# Patient Record
Sex: MALE | Race: WHITE | Employment: OTHER | URBAN - METROPOLITAN AREA
[De-identification: names, ages, dates, MRNs, and addresses within clinical notes are randomized per-mention and may not be internally consistent; named-entity substitution may affect disease eponyms.]

---

## 2018-12-11 ENCOUNTER — OFFICE VISIT (OUTPATIENT)
Dept: OPTOMETRY | Facility: CLINIC | Age: 63
End: 2018-12-11
Payer: COMMERCIAL

## 2018-12-11 DIAGNOSIS — T15.01XA CORNEAL FOREIGN BODY, RIGHT, INITIAL ENCOUNTER: Primary | ICD-10-CM

## 2018-12-11 PROCEDURE — 65220 REMOVE FOREIGN BODY FROM EYE: CPT | Performed by: OPTOMETRIST

## 2018-12-11 PROCEDURE — 99203 OFFICE O/P NEW LOW 30 MIN: CPT | Mod: 25 | Performed by: OPTOMETRIST

## 2018-12-11 RX ORDER — OFLOXACIN 3 MG/ML
1 SOLUTION/ DROPS OPHTHALMIC 4 TIMES DAILY
Status: DISPENSED | OUTPATIENT
Start: 2018-12-11

## 2018-12-11 ASSESSMENT — CONF VISUAL FIELD
OD_NORMAL: 1
OS_NORMAL: 1

## 2018-12-11 ASSESSMENT — TONOMETRY
IOP_METHOD: ICARE
OS_IOP_MMHG: 16
OD_IOP_MMHG: 13

## 2018-12-11 ASSESSMENT — EXTERNAL EXAM - LEFT EYE: OS_EXAM: NORMAL

## 2018-12-11 ASSESSMENT — SLIT LAMP EXAM - LIDS: COMMENTS: NORMAL

## 2018-12-11 ASSESSMENT — EXTERNAL EXAM - RIGHT EYE: OD_EXAM: NORMAL

## 2018-12-11 ASSESSMENT — VISUAL ACUITY
OS_CC: 20/25
METHOD: SNELLEN - LINEAR
OD_PH_CC: 20/30
OD_CC: 20/70
CORRECTION_TYPE: GLASSES

## 2018-12-11 NOTE — PATIENT INSTRUCTIONS
Metallic foreign body removed from central right cornea with daiana brush today.  Patient tolerated procedure well.   Begin ofloxacin eye drops 4x daily in the right eye only for 1 week.     Return by the end of the week if no improvement in symptoms.        Larry Latham O.D.  [address]  [place]  [address]    [phone]

## 2018-12-11 NOTE — LETTER
12/11/2018         RE: Magdaleno Springer  [address] [facility]  [place] [address]        Dear Colleague,    Thank you for referring your patient, Magdaleno Springer, to the [facility]. Please see a copy of my visit note below.    Chief Complaint   Patient presents with     Eye Pain       Do you wear contact lenses? No        Larry Latham, OD     See Review Of Systems   Eyes: positive for eye pain, photophobia  Constitutional: No fevers, chills, or weight changes.      HPI performed by Optometrist  scribed by Meka Seay, Optometric Tech    Medical, surgical and family histories reviewed and updated 12/11/2018.         OBJECTIVE: See Ophthalmology exam    ASSESSMENT:    ICD-10-CM    1. Corneal foreign body, right, initial encounter T15.01XA ofloxacin (OCUFLOX) 0.3 % ophthalmic solution 1 drop      PLAN:    Patient Instructions   Metallic foreign body removed from central right cornea with daiana brush today.  Patient tolerated procedure well.   Begin ofloxacin eye drops 4x daily in the right eye only for 1 week.     Return by the end of the week if no improvement in symptoms.        Larry Latham O.D.  [facility]  [address] [place] [address]. [place]  [address]    [phone]           Again, thank you for allowing me to participate in the care of your patient.        Sincerely,        Larry Latham, OD

## 2018-12-11 NOTE — PROGRESS NOTES
Chief Complaint   Patient presents with     Eye Pain       Do you wear contact lenses? No        Larry Latham, OD     See Review Of Systems   Eyes: positive for eye pain, photophobia  Constitutional: No fevers, chills, or weight changes.      HPI performed by Optometrist  scribed by Meka Seay, Optometric Tech    Medical, surgical and family histories reviewed and updated 12/11/2018.         OBJECTIVE: See Ophthalmology exam    ASSESSMENT:    ICD-10-CM    1. Corneal foreign body, right, initial encounter T15.01XA ofloxacin (OCUFLOX) 0.3 % ophthalmic solution 1 drop      PLAN:    Patient Instructions   Metallic foreign body removed from central right cornea with daiana brush today.  Patient tolerated procedure well.   Begin ofloxacin eye drops 4x daily in the right eye only for 1 week.     Return by the end of the week if no improvement in symptoms.        Larry Latham O.D.  [address] [place]  [address]    [phone]

## 2019-01-01 ENCOUNTER — NURSE TRIAGE (OUTPATIENT)
Dept: NURSING | Facility: CLINIC | Age: 64
End: 2019-01-01

## 2019-01-02 ENCOUNTER — OFFICE VISIT (OUTPATIENT)
Dept: FAMILY MEDICINE | Facility: CLINIC | Age: 64
End: 2019-01-02
Payer: COMMERCIAL

## 2019-01-02 VITALS
TEMPERATURE: 97.4 F | OXYGEN SATURATION: 99 % | WEIGHT: 204 LBS | DIASTOLIC BLOOD PRESSURE: 73 MMHG | RESPIRATION RATE: 16 BRPM | HEART RATE: 57 BPM | SYSTOLIC BLOOD PRESSURE: 121 MMHG

## 2019-01-02 DIAGNOSIS — B02.9 HERPES ZOSTER WITHOUT COMPLICATION: ICD-10-CM

## 2019-01-02 DIAGNOSIS — L98.9 SKIN LESION: Primary | ICD-10-CM

## 2019-01-02 DIAGNOSIS — J40 BRONCHITIS: ICD-10-CM

## 2019-01-02 PROCEDURE — 99214 OFFICE O/P EST MOD 30 MIN: CPT | Performed by: FAMILY MEDICINE

## 2019-01-02 RX ORDER — PRAMIPEXOLE DIHYDROCHLORIDE 1.5 MG/1
TABLET ORAL
Refills: 3 | COMMUNITY
Start: 2018-11-27

## 2019-01-02 RX ORDER — VALACYCLOVIR HYDROCHLORIDE 1 G/1
1000 TABLET, FILM COATED ORAL 3 TIMES DAILY
Qty: 21 TABLET | Refills: 0 | Status: SHIPPED | OUTPATIENT
Start: 2019-01-02 | End: 2019-08-21

## 2019-01-02 RX ORDER — BENZONATATE 200 MG/1
200 CAPSULE ORAL 3 TIMES DAILY PRN
Qty: 21 CAPSULE | Refills: 0 | Status: SHIPPED | OUTPATIENT
Start: 2019-01-02 | End: 2019-01-09

## 2019-01-02 RX ORDER — GABAPENTIN 100 MG/1
100 CAPSULE ORAL
COMMUNITY
Start: 2018-07-18 | End: 2019-08-21

## 2019-01-02 RX ORDER — VALACYCLOVIR HYDROCHLORIDE 1 G/1
1 TABLET, FILM COATED ORAL
COMMUNITY
Start: 2018-08-27 | End: 2019-08-21

## 2019-01-02 RX ORDER — ATORVASTATIN CALCIUM 10 MG/1
TABLET, FILM COATED ORAL
Refills: 3 | COMMUNITY
Start: 2018-03-29 | End: 2019-08-21

## 2019-01-02 RX ORDER — ATORVASTATIN CALCIUM 80 MG/1
10 TABLET, FILM COATED ORAL
COMMUNITY
Start: 2014-01-13 | End: 2019-08-21

## 2019-01-02 ASSESSMENT — PAIN SCALES - GENERAL: PAINLEVEL: NO PAIN (0)

## 2019-01-02 NOTE — PROGRESS NOTES
SUBJECTIVE:   Magdaleno Springer is a 63 year old male who presents to clinic today for the following health issues:      Patient presents with:  Derm Problem: burning rash under left arm x 2 days    Patient is also concerned about a mole on his back and behind his left ear     Felt the rash 3 days ago behind the left arm/left upper back  Patient also started having a cold    Last night started having pain    Patient has had cold for about a week now - that's getting better but still have a lingering cough  Fever No  Sinus congestion/sinus pain No  Wheezing: No  Chest pain or exertional shortness of breath: NO   Exposure to pertussis or pertussis like symptoms: No  Orthopnea, worsening edema, pnd: NO  Rash: NO  Tried OTC medications without relief  No hemoptysis.  Worsening symptoms hence patient came in to be seen     Problem list and histories reviewed & adjusted, as indicated.  Additional history: as documented    Problem list, Medication list, Allergies, and Medical/Social/Surgical histories reviewed in EPIC and updated as appropriate.    ROS:  Constitutional, HEENT, cardiovascular, pulmonary, gi and gu systems are negative, except as otherwise noted.    OBJECTIVE:                                                    /73   Pulse 57   Temp 97.4  F (36.3  C) (Oral)   Resp 16   Wt 92.5 kg (204 lb)   SpO2 99%   There is no height or weight on file to calculate BMI.  GENERAL: healthy, alert and no distress  EYES: pink palpebral conjunctiva, anicteric sclera  ENT: midline nasal septum normal ear exam. Slight erythema but not much congested sinuses.   Mouth: moist buccal mucosa nonhyperemic posterior pharyngeal wall. No tonsillar enlargement or cellulitis  NECK: no adenopathy, no asymmetry, masses, or scars and thyroid normal to palpation  RESP: lungs clear to auscultation - No  rales, rhonchi or wheezes    CV: regular rate and rhythm, normal S1 S2, no S3 or S4,  No murmurs, click or rub  SKIN:   1. Erythematous  vesicles of erythematous base clustered over left flank upper back area following dermatomal distribution  2. seb keratoses noted on left back  3. Hyperpigmented skin lesion with slight scale left lower back   Pscyh: Appropriate mood and affect  MS: no gross musculoskeletal defects noted    Diagnostic Test Results:  none      ASSESSMENT/PLAN:                                                        ICD-10-CM    1. Skin lesion L98.9 DERMATOLOGY REFERRAL   2. Herpes zoster without complication B02.9 valACYclovir (VALTREX) 1000 mg tablet   3. Bronchitis J40 benzonatate (TESSALON) 200 MG capsule     Given AVS and discussed    Prescribed with valtrex for shingles  Alarm signs or symptoms discussed, if present recommend go to ER   Possible complications discussed     Bronchitis presumed viral. Already improving   Advised that prolonged cough > 3 weeks recommend chest xray, offered today, declined.   Adverse reactions of medications discussed.  Over the counter medications discussed.   Aware to come back in if with worsening symptoms or if no relief despite treatment plan  Patient voiced understanding and had no further questions.     Back skin lesions - per wife increasing in size- recommend dermatology evaluation - referral placed  Alarm signs or symptoms discussed, if present recommend go to ER       MD Milly Bajwa MD  [facility]

## 2019-01-02 NOTE — TELEPHONE ENCOUNTER
"Caller: Kenyetta, wife & self  Reason for call: \"he has a rash below his armpit, towards the back on his right side, could this be shingles?\" Patient reports having cold symptoms with cough x 3 days.  Symptoms: rash on right trunk (red, \"goose bump like\", bumpy, raised, 3 inches jaimie, with a couple spots further away), mild itch, burning pain (started yesterday)  Symptoms started: noticed on Sunday   Denies fever, severe itching, blisters, difficulty breathing or swallowing, or drainage. Denies new medication, foods, or topical irritants.  Pain? yes   Location: right trunk, \"a burning zing\"   Rated: \"3/10\"  Constant or intermittent? \"comes and goes\"    Home cares tried: cortisone cream  Educated to shingles symptoms and duration. Emergent symptoms reviewed.  Care advice given per triage protocol; per triage guideline, advised caller to be seen by a provider within the next 24 hours, sooner if possible to start antiviral if actual shingles. Caller verbalized understanding of care advice given and plans to try and be seen at a local urgent care clinic now. Caller had no further questions.     Tanya Em RN  [facility]  (see bottom of encounter for care advice details)    Reason for Disposition    [1] Shingles rash (matches SYMPTOMS) AND [2] onset within past 72 hours    Additional Information    Negative: [1] Sudden onset of rash (within last 2 hours) AND [2] difficulty with breathing or swallowing    Negative: Sounds like a life-threatening emergency to the triager    Shingles suspected (i.e., painful rash, multiple small blisters grouped together in one area of body; dermatomal distribution)    Negative: Difficult to awaken or acting confused  (e.g., disoriented, slurred speech)    Negative: Sounds like a life-threatening emergency to the triager    Negative: [1] Localized rash AND [2] doesn't match the SYMPTOMS of shingles    Negative: [1] Back pain AND [2] doesn't match the SYMPTOMS of shingles    " "Negative: Patient sounds very sick or weak to the triager    Negative: [1] Shingles rash (matches SYMPTOMS) AND [2] weak immune system (e.g., HIV positive,  cancer chemotherapy, chronic steroid treatment, splenectomy) AND [3] NOT taking antiviral medication    Negative: Shingles rash on the eyelid or tip of the nose    Negative: [1] Shingles rash of face AND [2] eye pain or blurred vision    Negative: [1] Shingles rash of face AND [2] facial weakness    Negative: [1] Shingles rash of face or ear AND [2] earache or ringing in the ear    Negative: [1] Shingles rash AND [2] spots start appearing other places on body    Negative: Fever > 100.5 F (38.1 C)    Negative: SEVERE pain (e.g., excruciating)    Answer Assessment - Initial Assessment Questions  1. APPEARANCE of RASH: \"Describe the rash.\"       Red, small bumps  2. LOCATION: \"Where is the rash located?\"       Right trunk  3. ONSET: \"When did the rash start?\"       Sunday?  4. ITCHING: \"Does the rash itch?\" If so, ask: \"How bad is the itch?\"  (Scale 1-10; or mild, moderate, severe)      mild  5. PAIN: \"Does the rash hurt?\" If so, ask: \"How bad is the pain?\"  (Scale 1-10; or mild, moderate, severe)      mild  6. OTHER SYMPTOMS: \"Do you have any other symptoms?\" (e.g., fever)      Cold symptoms  7. PREGNANCY: \"Is there any chance you are pregnant?\" \"When was your last menstrual period?\"      male    Protocols used: SHINGLES-ADULT-AH, RASH OR REDNESS - LOCALIZED-ADULT-AH      "

## 2019-01-02 NOTE — PATIENT INSTRUCTIONS
Patient Education     Shingles  Shingles is a viral infection caused by the same virus that causes chicken pox. Anyone who has had chicken pox may get shingles later in life. The virus stays in the body, but remains asleep (dormant). Shingles often occurs in older persons or persons with lowered immunity. But it can affect anyone at any age.  Shingles starts as a tingling patch of skin on one side of the body. Small, painful blisters may then appear. The rash rarely spreads to other parts of the body.  Exposure to shingles can't cause shingles. However, it can cause chicken pox in anyone who has not had chicken pox or has not been vaccinated. The contagious period ends when all blisters have crusted over, generally 1 to 2 weeks after the illness starts.  After the blisters heal, the affected skin may be sensitive or painful for weeks or months, gradually resolving over time. But, sometimes this can last longer and be permanent (called postherpetic neuralgia.)  Shingles vaccines are available. Vaccination can help prevent shingles or make it less painful. It is generally recommended for adults older than 50, even if you've had singles in the past. Talk with your healthcare provider about when to get vaccinated and which vaccine is best for you.  Home care    Medicines may be prescribed to help relieve pain. Take these medicines as directed. Ask your healthcare provider or pharmacist before using over-the-counter medicines for helping treat pain and itching.    In certain cases, antiviral medicines may be prescribed to reduce pain, shorten the illness, and prevent neuralgia. Take these medicines as directed.    Compresses made from a solution of cool water mixed with cornstarch or baking soda may help relieve pain and itching.     Gently wash skin daily with soap and water to help prevent infection. Be certain to rinse off all of the soap, which can be irritating.    Trim fingernails and try not to scratch.  Scratching the sores may leave scars.    Stay home from work or school until all blisters have formed a crust and you are no longer contagious.  Follow-up care  Follow up with your healthcare provider, or as directed.  When to seek medical advice    Fever of 100.4 F (38 C) or higher, or as directed by your healthcare provider    Affected skin is on the face or neck and any of the following occur:  ? Headache  ? Eye pain  ? Changes in vision  ? Sores near the eye  ? Weakness of facial muscles    Blisters occurring on new areas of the body    Pain, redness, or swelling of a joint    Signs of skin infection: colored drainage from the sores, warmth, increasing redness, fever, or increasing pain   Date Last Reviewed: 4/1/2018 2000-2018 [employer]. [address] [place] [address], [place] [address]. All rights reserved. This information is not intended as a substitute for professional medical care. Always follow your healthcare professional's instructions.           Patient Education     Viral Bronchitis (Adult)    You have a viral bronchitis. Bronchitis is inflammation and swelling of the lining of the lungs. This is often caused by an infection. Symptoms include a dry, hacking cough that is worse at night. The cough may bring up yellow-green mucus. You may also feel short of breath or wheeze. Other symptoms may include tiredness, chest discomfort, and chills.  Bronchitis that is caused by a virus is not treated with antibiotics. Instead, medicines may be given to help relieve symptoms. Symptoms can last up to 2 weeks, although the cough may last much longer.  This illness is contagious during the first few days and is spread through the air by coughing and sneezing, or by direct contact (touching the sick person and then touching your own eyes, nose, or mouth).  Most viral illnesses resolve within 10 to 14 days with rest and simple home remedies, although they may sometimes last for several weeks.  Home  care    If symptoms are severe, rest at home for the first 2 to 3 days. When you go back to your usual activities, don't let yourself get too tired.    Do not smoke. Also avoid being exposed to secondhand smoke.    You may use over-the-counter medicine to control fever or pain, unless another pain medicine was prescribed. If you have chronic liver or kidney disease or have ever had a stomach ulcer or gastrointestinal bleeding, talk with your healthcare provider before using these medicines. Also talk to your provider if you are taking medicine to prevent blood clots. Aspirin should never be given to anyone younger than 18 years of age who is ill with a viral infection or fever. It may cause severe liver or brain damage.    Your appetite may be poor, so a light diet is fine. Avoid dehydration by drinking 6 to 8 glasses of fluids per day (such as water, soft drinks, sports drinks, juices, tea, or soup). Extra fluids will help loosen secretions in the nose and lungs.    Over-the-counter cough, cold, and sore-throat medicines will not shorten the length of the illness, but they may help to reduce symptoms. Don't use decongestants if you have high blood pressure.  Follow-up care  Follow up with your healthcare provider, or as advised. If you had an X-ray or ECG (electrocardiogram), a specialist will review it. You will be notified of any new findings that may affect your care.  If you are age 65 or older, or if you have a chronic lung disease or condition that affects your immune system, or you smoke, ask your healthcare provider about getting a pneumococcal vaccine and a yearly flu shot (influenza vaccine).  When to seek medical advice  Call your healthcare provider right away if any of these occur:    Fever of 100.4 F (38 C) or higher, or as directed by your healthcare provider    Coughing up increased amounts of colored sputum    Weakness, drowsiness, headache, facial pain, ear pain, or a stiff neck  Call 911  Call  911 if any of these occur:    Coughing up blood    Worsening weakness, drowsiness, headache, or stiff neck    Trouble breathing, wheezing, or pain with breathing  Date Last Reviewed: 6/1/2018 2000-2018 [employer]. [address] [place] [address]. All rights reserved. This information is not intended as a substitute for professional medical care. Always follow your healthcare professional's instructions.           Patient Education     Valacyclovir Hydrochloride Oral tablet  What is this medicine?  VALACYCLOVIR (josh ay SYE kloe veer) is an antiviral medicine. It is used to treat or prevent infections caused by certain kinds of viruses. Examples of these infections include herpes and shingles. This medicine will not cure herpes.  This medicine may be used for other purposes; ask your health care provider or pharmacist if you have questions.  What should I tell my health care provider before I take this medicine?  They need to know if you have any of these conditions:    acquired immunodeficiency syndrome (AIDS)    any other condition that may weaken the immune system    bone marrow or kidney transplant    kidney disease    an unusual or allergic reaction to valacyclovir, acyclovir, ganciclovir, valganciclovir, other medicines, foods, dyes, or preservatives    pregnant or trying to get pregnant    breast-feeding  How should I use this medicine?  Take this medicine by mouth with a glass of water. Follow the directions on the prescription label. You can take this medicine with or without food. Take your doses at regular intervals. Do not take your medicine more often than directed. Finish the full course prescribed by your doctor or health care professional even if you think your condition is better. Do not stop taking except on the advice of your doctor or health care professional.  Talk to your pediatrician regarding the use of this medicine in children. While this drug may be prescribed for  children as young as 2 years for selected conditions, precautions do apply.  Overdosage: If you think you have taken too much of this medicine contact a poison control center or emergency room at once.  NOTE: This medicine is only for you. Do not share this medicine with others.  What if I miss a dose?  If you miss a dose, take it as soon as you can. If it is almost time for your next dose, take only that dose. Do not take double or extra doses.  What may interact with this medicine?    cimetidine    probenecid  This list may not describe all possible interactions. Give your health care provider a list of all the medicines, herbs, non-prescription drugs, or dietary supplements you use. Also tell them if you smoke, drink alcohol, or use illegal drugs. Some items may interact with your medicine.  What should I watch for while using this medicine?  Tell your doctor or health care professional if your symptoms do not start to get better after 1 week.  This medicine works best when taken early in the course of an infection, within the first 72 hours. Begin treatment as soon as possible after the first signs of infection like tingling, itching, or pain in the affected area.  It is possible that genital herpes may still be spread even when you are not having symptoms. Always use safer sex practices like condoms made of latex or polyurethane whenever you have sexual contact.  You should stay well hydrated while taking this medicine. Drink plenty of fluids.  What side effects may I notice from receiving this medicine?  Side effects that you should report to your doctor or health care professional as soon as possible:    allergic reactions like skin rash, itching or hives, swelling of the face, lips, or tongue    aggressive behavior    confusion    hallucinations    problems with balance, talking, walking    stomach pain    tremor    trouble passing urine or change in the amount of urine  Side effects that usually do not  require medical attention (report to your doctor or health care professional if they continue or are bothersome):    dizziness    headache    nausea, vomiting  This list may not describe all possible side effects. Call your doctor for medical advice about side effects. You may report side effects to FDA at 0-939-OPR-9944.  Where should I keep my medicine?  Keep out of the reach of children.  Store at room temperature between 15 and 25 degrees C (59 and 77 degrees F). Keep container tightly closed. Throw away any unused medicine after the expiration date.  NOTE:This sheet is a summary. It may not cover all possible information. If you have questions about this medicine, talk to your doctor, pharmacist, or health care provider. Copyright  2016 [employer]

## 2019-01-03 ENCOUNTER — PATIENT OUTREACH (OUTPATIENT)
Dept: CARE COORDINATION | Facility: CLINIC | Age: 64
End: 2019-01-03

## 2019-01-04 ENCOUNTER — OFFICE VISIT (OUTPATIENT)
Dept: DERMATOLOGY | Facility: CLINIC | Age: 64
End: 2019-01-04

## 2019-01-04 DIAGNOSIS — L21.9 DERMATITIS, SEBORRHEIC: ICD-10-CM

## 2019-01-04 DIAGNOSIS — B02.9 HERPES ZOSTER WITHOUT COMPLICATION: Primary | ICD-10-CM

## 2019-01-04 RX ORDER — TRIAMCINOLONE ACETONIDE 1 MG/G
OINTMENT TOPICAL 2 TIMES DAILY PRN
Qty: 30 G | Refills: 0 | Status: SHIPPED | OUTPATIENT
Start: 2019-01-04 | End: 2019-08-21

## 2019-01-04 RX ORDER — FLUOCINOLONE ACETONIDE 0.11 MG/ML
OIL TOPICAL DAILY PRN
Qty: 118.28 ML | Refills: 1 | Status: SHIPPED | OUTPATIENT
Start: 2019-01-04 | End: 2019-08-21

## 2019-01-04 ASSESSMENT — PAIN SCALES - GENERAL: PAINLEVEL: NO PAIN (0)

## 2019-01-04 NOTE — PROGRESS NOTES
[facility] Dermatology Note      Dermatology Problem List:  FBSE 1/4/19  1. Shingles of left lateral chest  - valtrex, triamcinolone, gabapentin  2. Seb derm  - fluocinolone oil for ears  3. Benign nevi  4. SKs    Encounter Date: Jan 4, 2019    CC:   Chief Complaint   Patient presents with     Skin Check     Magdaleno is here for skin check.         History of Present Illness:  Mr. Magdaleno Springer is a 63 year old male who presents as a referral from [facility] for skin check and possible shingles. Has had a mole on the back that has been present for years that was removed but has regrown. Has otherwise been asymptomatic.      First noticed rash on Sunday and then developed pain about 2 days later. Was thought to have shingles by PCP. Did have shingles vaccine about 5-6 years ago.       Notes that he often gets itchiness inside of his ears. Has been using his wife's clobetasol solution for treatment.      Patient denies any painful, bleeding, changing, or darkening lesions.    Patient reports no use of SPF. Reports no history of tanning bed use. Reports history of blistering sunburns. No personal or family history of skin cancer.        Past Medical History:   There is no problem list on file for this patient.    No past medical history on file.  Past Surgical History:   Procedure Laterality Date     RETINAL REATTACHMENT         Social History:  Patient reports that  has never smoked. he has never used smokeless tobacco.    Family History:  Family History   Problem Relation Age of Onset     Glaucoma No family hx of      Macular Degeneration No family hx of      Melanoma No family hx of      Skin Cancer No family hx of        Medications:  Current Outpatient Medications   Medication Sig Dispense Refill     atorvastatin (LIPITOR) 10 MG tablet   3     atorvastatin (LIPITOR) 80 MG tablet Take 10 mg by mouth       benzonatate (TESSALON) 200 MG capsule Take 1 capsule (200 mg) by mouth 3 times daily as  needed for cough 21 capsule 0     gabapentin (NEURONTIN) 100 MG capsule Take 100 mg by mouth       pramipexole (MIRAPEX) 1.5 MG tablet   3     valACYclovir (VALTREX) 1000 mg tablet Take 1 tablet by mouth       valACYclovir (VALTREX) 1000 mg tablet Take 1 tablet (1,000 mg) by mouth 3 times daily for 7 days 21 tablet 0        No Known Allergies      Review of Systems:  -As per HPI  -Constitutional: Otherwise feeling well today, in usual state of health.  -HEENT: Patient denies nonhealing oral sores.  -Skin: As above in HPI. No additional skin concerns.    Physical exam:  Vitals: There were no vitals taken for this visit.  GEN: This is a well developed, well-nourished male in no acute distress, in a pleasant mood.    SKIN: Full skin, which includes the head/face, both arms, chest, back, abdomen,both legs, genitalia and/or groin buttocks, digits and/or nails, was examined.  -Outer ear canals without obvious scaling or erythema  -Few scattered regular brown pigmented macules and papules are identified on the trunk and extremities.   -There are waxy stuck on tan to brown papules on the trunk.  -There are dome shaped bright red papules on the trunk.  -Left lateral chest with ovoid cluster of dark red papules in dermatomal distribution.    -No other lesions of concern on areas examined.     Impression/Plan:  1. Multiple clinically benign nevi on the trunk    ABCDs of melanoma were discussed and self skin checks were advised.    Sun precaution was advised including the use of sun screens of SPF 30 or higher, sun protective clothing, and avoidance of tanning beds.    2. Seborrheic keratosis, non irritated    Discussed benign nature of lesions    3. Shingles of left lateral chest    Complete valtrex as prescribed by PCP    Recommended use of triamcinolone 0.1% ointment BID PRN    May take gabapentin 300mg TID (instructed to slowly uptitrate to max dose) if he is having pain    Instructed to hold off on having shingrix and flu  vaccines for at least 1-2 weeks until rash clears    Discussed that he should follow-up with PCP    4. Seborrheic dermatitis of ears    Start fluocinolone 0.01% oil to ears daily PRN itching    Recommended that he stop using clobetasol solution      CC Referred Self, MD  No address on file on close of this encounter.  Follow-up in 2 years, earlier for new or changing lesions.       Staff Physician Comments:  I saw and evaluated the patient with the resident and I agree with the assessment and plan as documented in the resident's note.    Magdaleno Medina MD  Professor  Head of Dermato-Allergy Division  Department of Dermatology  [facility]      Staff Involved:  Resident(Pati Ruth)/Staff(as above)    Pati Ruth M.D.  PGY-3 Resident  Dermatology  [facility]

## 2019-01-04 NOTE — PATIENT INSTRUCTIONS
"The ABCDEs of Melanoma    Skin cancer can develop anywhere on the skin. Ask someone for help when checking your skin, especially in hard to see places. If you notice a mole different from others, or that changes, enlarges, itches, or bleeds (even if it is small), you should see a dermatologist.                  Overview    Seborrheic keratoses are common benign growths of unknown cause seen in adults   due to a thickening of an area of the top skin layer.    Who's At Risk  Although they can occur anytime after puberty, almost everyone over 50 has one or more of these and they increase in number with age. Some families have an inherited tendency to grow multiple lesions. Men and women are equally as likely to develop seborrheic keratoses. Dark-skinned people are less affected than those with light skin; a variant seen in blacks is called dermatosis papulosa nigra.    Signs & Symptoms  One or more spots can occur anywhere on the body, except for palms, soles, and mucous membranes (eg, in the mouth or rectum). They do not go away. They do not turn into cancers, but some cancers resemble seborrheic keratosis.    They start as light brown to skin-colored, flat areas, which are round to oval and of varying size (usually less than a half inch, but sometimes much larger). As they grow thicker and rise above the skin surface, seborrheic keratoses may become dark brown to almost black with a \"stuck on\" appearance. The surface may feel smooth or rough.    Self-Care Guidelines  No treatment is needed unless there is irritation from clothing with itching or bleeding.    There is no way to prevent new spots from forming.    Some lotions with alpha hydroxyl acids may make the areas feel smoother with regular use but will not eliminate them.    OTC freezing techniques are available but usually not effective.    When to Seek Medical Care  If a spot on the skin is growing, bleeding, painful, or itchy, see your doctor.    Spots can be " removed if you don't want them, but removal is considered a cosmetic issue and is usually not covered by insurance.    Treatments Your Physician May Prescribe  Removal can be accomplished with freezing (cryosurgery), scraping (curettage), burning (electrocautery), lasers, or with acids. The doctor might conduct a biopsy if the growth looks unusual.    References:  Diego Lua, ed. Dermatology, pp.9633-8846. [place]: [employer], 2003.    Israel Garcia, ed. Daya's Dermatology in General Medicine. 6th ed, pp.767-770. [place]: [employer], 2003.

## 2019-01-04 NOTE — NURSING NOTE
Dermatology Rooming Note    Magdaleno Springer's goals for this visit include:   Chief Complaint   Patient presents with     Skin Check     Magdaleno is here for skin check.     Esha Tiwari, CMA

## 2019-01-04 NOTE — LETTER
Date:January 7, 2019      Patient was self referred, no letter generated. Do not send.        [facility] Health Information

## 2019-01-04 NOTE — LETTER
1/4/2019       RE: Magdaleno Springer  [address]  [place] [address]     Dear Colleague,    Thank you for referring your patient, Magdaleno Springer, to the [facility] DERMATOLOGY at [facility]. Please see a copy of my visit note below.    [facility] Dermatology Note      Dermatology Problem List:  FBSE 1/4/19  1. Shingles of left lateral chest  - valtrex, triamcinolone, gabapentin  2. Seb derm  - fluocinolone oil for ears  3. Benign nevi  4. SKs    Encounter Date: Jan 4, 2019    CC:   Chief Complaint   Patient presents with     Skin Check     Magdaleno is here for skin check.         History of Present Illness:  Mr. Magdaleno Springer is a 63 year old male who [facility] and possible shingles. Has had a mole on the back that has been present for years that was removed but has regrown. Has otherwise been asymptomatic.      First noticed rash on Sunday and then developed pain about 2 days later. Was thought to have shingles by PCP. Did have shingles vaccine about 5-6 years ago.       Notes that he often gets itchiness inside of his ears. Has been using his wife's clobetasol solution for treatment.      Patient denies any painful, bleeding, changing, or darkening lesions.    Patient reports no use of SPF. Reports no history of tanning bed use. Reports history of blistering sunburns. No personal or family history of skin cancer.        Past Medical History:   There is no problem list on file for this patient.    No past medical history on file.  Past Surgical History:   Procedure Laterality Date     RETINAL REATTACHMENT         Social History:  Patient reports that  has never smoked. he has never used smokeless tobacco.    Family History:  Family History   Problem Relation Age of Onset     Glaucoma No family hx of      Macular Degeneration No family hx of      Melanoma No family hx of      Skin Cancer No family hx of         Medications:  Current Outpatient Medications   Medication Sig Dispense Refill     atorvastatin (LIPITOR) 10 MG tablet   3     atorvastatin (LIPITOR) 80 MG tablet Take 10 mg by mouth       benzonatate (TESSALON) 200 MG capsule Take 1 capsule (200 mg) by mouth 3 times daily as needed for cough 21 capsule 0     gabapentin (NEURONTIN) 100 MG capsule Take 100 mg by mouth       pramipexole (MIRAPEX) 1.5 MG tablet   3     valACYclovir (VALTREX) 1000 mg tablet Take 1 tablet by mouth       valACYclovir (VALTREX) 1000 mg tablet Take 1 tablet (1,000 mg) by mouth 3 times daily for 7 days 21 tablet 0        No Known Allergies      Review of Systems:  -As per HPI  -Constitutional: Otherwise feeling well today, in usual state of health.  -HEENT: Patient denies nonhealing oral sores.  -Skin: As above in HPI. No additional skin concerns.    Physical exam:  Vitals: There were no vitals taken for this visit.  GEN: This is a well developed, well-nourished male in no acute distress, in a pleasant mood.    SKIN: Full skin, which includes the head/face, both arms, chest, back, abdomen,both legs, genitalia and/or groin buttocks, digits and/or nails, was examined.  -Outer ear canals without obvious scaling or erythema  -Few scattered regular brown pigmented macules and papules are identified on the trunk and extremities.   -There are waxy stuck on tan to brown papules on the trunk.  -There are dome shaped bright red papules on the trunk.  -Left lateral chest with ovoid cluster of dark red papules in dermatomal distribution.    -No other lesions of concern on areas examined.     Impression/Plan:  1. Multiple clinically benign nevi on the trunk    ABCDs of melanoma were discussed and self skin checks were advised.    Sun precaution was advised including the use of sun screens of SPF 30 or higher, sun protective clothing, and avoidance of tanning beds.    2. Seborrheic keratosis, non irritated    Discussed benign nature of  lesions    3. Shingles of left lateral chest    Complete valtrex as prescribed by PCP    Recommended use of triamcinolone 0.1% ointment BID PRN    May take gabapentin 300mg TID (instructed to slowly uptitrate to max dose) if he is having pain    Instructed to hold off on having shingrix and flu vaccines for at least 1-2 weeks until rash clears    Discussed that he should follow-up with PCP    4. Seborrheic dermatitis of ears    Start fluocinolone 0.01% oil to ears daily PRN itching    Recommended that he stop using clobetasol solution      CC Referred Self, MD  No address on file on close of this encounter.  Follow-up in 2 years, earlier for new or changing lesions.       Staff Physician Comments:  I saw and evaluated the patient with the resident and I agree with the assessment and plan as documented in the resident's note.    Magdaleno Medina MD  Professor  Head of Dermato-Allergy Division  Department of Dermatology  [facility], [place], [place]      Staff Involved:  Resident(Pati Ruth)/Staff(as above)    Pati Ruth M.D.  PGY-3 Resident  Dermatology  [facility]      Again, thank you for allowing me to participate in the care of your patient.      Sincerely,    Magdaleno Medina MD

## 2019-01-14 ENCOUNTER — ALLIED HEALTH/NURSE VISIT (OUTPATIENT)
Dept: NURSING | Facility: CLINIC | Age: 64
End: 2019-01-14
Payer: COMMERCIAL

## 2019-01-14 DIAGNOSIS — Z23 NEED FOR PROPHYLACTIC VACCINATION AND INOCULATION AGAINST INFLUENZA: Primary | ICD-10-CM

## 2019-01-14 DIAGNOSIS — Z23 ENCOUNTER FOR IMMUNIZATION: ICD-10-CM

## 2019-01-14 PROCEDURE — 90471 IMMUNIZATION ADMIN: CPT

## 2019-01-14 PROCEDURE — 90750 HZV VACC RECOMBINANT IM: CPT

## 2019-01-14 PROCEDURE — 90686 IIV4 VACC NO PRSV 0.5 ML IM: CPT

## 2019-01-14 PROCEDURE — 90472 IMMUNIZATION ADMIN EACH ADD: CPT

## 2019-01-14 PROCEDURE — 99207 ZZC NO CHARGE NURSE ONLY: CPT

## 2019-01-14 NOTE — NURSING NOTE
Screening Questionnaire for Adult Immunization    Are you sick today?   No   Do you have allergies to medications, food, a vaccine component or latex?   No   Have you ever had a serious reaction after receiving a vaccination?   No   Do you have a long-term health problem with heart disease, lung disease, asthma, kidney disease, metabolic disease (e.g. diabetes), anemia, or other blood disorder?   No   Do you have cancer, leukemia, HIV/AIDS, or any other immune system problem?   No   In the past 3 months, have you taken medications that affect  your immune system, such as prednisone, other steroids, or anticancer drugs; drugs for the treatment of rheumatoid arthritis, Crohn s disease, or psoriasis; or have you had radiation treatments?   No   Have you had a seizure, or a brain or other nervous system problem?   No   During the past year, have you received a transfusion of blood or blood     products, or been given immune (gamma) globulin or antiviral drug?   No   For women: Are you pregnant or is there a chance you could become        pregnant during the next month?   No   Have you received any vaccinations in the past 4 weeks?   No     Immunization questionnaire answers were all negative.        Per orders of Dr. Morataya, injection of shingrix given by Carrie Arauz. Patient instructed to remain in clinic for 15 minutes afterwards, and to report any adverse reaction to me immediately.       Screening performed by Carrie Arauz on 1/14/2019 at 1:42 PM.

## 2019-01-14 NOTE — PROGRESS NOTES

## 2019-03-13 ENCOUNTER — OFFICE VISIT (OUTPATIENT)
Dept: URGENT CARE | Facility: URGENT CARE | Age: 64
End: 2019-03-13
Payer: COMMERCIAL

## 2019-03-13 ENCOUNTER — ANCILLARY PROCEDURE (OUTPATIENT)
Dept: GENERAL RADIOLOGY | Facility: CLINIC | Age: 64
End: 2019-03-13
Attending: FAMILY MEDICINE
Payer: COMMERCIAL

## 2019-03-13 VITALS
WEIGHT: 218 LBS | HEART RATE: 58 BPM | SYSTOLIC BLOOD PRESSURE: 122 MMHG | TEMPERATURE: 97.7 F | DIASTOLIC BLOOD PRESSURE: 82 MMHG | OXYGEN SATURATION: 98 %

## 2019-03-13 DIAGNOSIS — J22 LOWER RESPIRATORY INFECTION (E.G., BRONCHITIS, PNEUMONIA, PNEUMONITIS, PULMONITIS): ICD-10-CM

## 2019-03-13 DIAGNOSIS — R05.8 COUGH PRESENT FOR GREATER THAN 3 WEEKS: Primary | ICD-10-CM

## 2019-03-13 PROCEDURE — 99214 OFFICE O/P EST MOD 30 MIN: CPT | Performed by: FAMILY MEDICINE

## 2019-03-13 PROCEDURE — 71046 X-RAY EXAM CHEST 2 VIEWS: CPT

## 2019-03-13 RX ORDER — DOXYCYCLINE 100 MG/1
100 CAPSULE ORAL 2 TIMES DAILY WITH MEALS
Qty: 20 CAPSULE | Refills: 0 | Status: SHIPPED | OUTPATIENT
Start: 2019-03-13 | End: 2019-08-21

## 2019-03-13 RX ORDER — CODEINE PHOSPHATE/GUAIFENESIN 10-100MG/5
5 LIQUID (ML) ORAL EVERY 8 HOURS PRN
Qty: 150 ML | Refills: 0 | Status: SHIPPED | OUTPATIENT
Start: 2019-03-13 | End: 2019-08-21

## 2019-03-14 NOTE — PROGRESS NOTES
Chief complaint: cough     Persistent cough for the past few months    Had a cold over [place] 3 months ago  Cough has been persistent  Patient was seen 2 months ago and was prescribed with benzonatate.  Cough continued.  Patient went to [place].     Cough occasionally productive occasional dry     Seems to have gotten worse lately    No known allergy     Fever No  Sinus congestion/sinus pain No  Wheezing: No  Chest pain or exertional shortness of breath: NO   However he has some chest pain with coughing if it is really bad   Exposure to pertussis or pertussis like symptoms: No  Orthopnea, worsening edema, pnd: NO  Rash: NO  Tried OTC medications without relief  No hemoptysis.  Worsening symptoms hence patient came in to be seen     Problem list and histories reviewed & adjusted, as indicated.  Additional history: as documented    Problem list, Medication list, Allergies, and Medical/Social/Surgical histories reviewed in [facility] and updated as appropriate.    ROS:  Constitutional, HEENT, cardiovascular, pulmonary, gi and gu systems are negative, except as otherwise noted.    OBJECTIVE:                                                    /82   Pulse 58   Temp 97.7  F (36.5  C) (Oral)   Wt 98.9 kg (218 lb)   SpO2 98%   There is no height or weight on file to calculate BMI.  GENERAL: healthy, alert and no distress  EYES: pink palpebral conjunctiva, anicteric sclera  ENT: midline nasal septum normal ear exam. Slight congested  sinuses.   Mouth: moist buccal mucosa nonhyperemic posterior pharyngeal wall. No tonsillar enlargement or cellulitis  NECK: no adenopathy, no asymmetry, masses, or scars and thyroid normal to palpation  RESP: lungs clear to auscultation - No  rales, rhonchi or wheezes    CV: regular rate and rhythm, normal S1 S2, no S3 or S4,  No murmurs, click or rub  SKIN: no visible rashes noted  Pscyh: Appropriate mood and affect  MS: no gross musculoskeletal defects noted    Diagnostic Test  Results:  Results for orders placed or performed in visit on 03/13/19 (from the past 24 hour(s))   XR Chest 2 Views    Narrative    XR CHEST 2 VW   3/13/2019 7:44 PM     HISTORY: Cough present for greater than 3 weeks    COMPARISON: None.    FINDINGS: The heart is negative.  The lungs are clear. The pulmonary  vasculature is normal.  Anterior decompression and fusion in the lower  cervical spine..      Impression    IMPRESSION: The lungs appear clear. No focal infiltrates are  identified.            ASSESSMENT/PLAN:                                                        ICD-10-CM    1. Cough present for greater than 3 weeks R05 XR Chest 2 Views   2. Lower respiratory infection (e.g., bronchitis, pneumonia, pneumonitis, pulmonitis) J22 doxycycline monohydrate (MONODOX) 100 MG capsule     guaiFENesin-codeine (GUAIFENESIN AC) 100-10 MG/5ML syrup       They thought he improved but then started getting worse again past few days  Will treat for possible bacterial bronchitis/atypical infection  Prescribed with doxycycline  Aware of the risks of GI intolerance, GERD, GI complications and photosensitivity with doxycycline.  Prescribed with guaifenesin with codeine. Warned sedating. Do not take with other sedating substances  Sedating medications given. Aware not to drive or operate machinery while on these medications. Caution with .     Patient has been travelling by car across country. This does increase the risk for PE.  He said one time he coughed so hard he had a little bit of blood in the sputum.  No signs or symptoms of DVT.  Given this risk - I discussed ruling out PE in the ER as we are not able to do this in urgent care. He declined.  He stated he will try this treatment  First and follow up with primary care provider if his symptoms persist.  If with any symptoms of chest pain or shortness of breath, lightheadedness, palpitations, feeling like passing out or change and worsening in the quality of your  symptoms, please proceed to ER. Recommend follow up with PCP in a few days for re-evaluation.   Risks discussed in detail. Risks of PE discussed with patient and his wife.    Adverse reactions of medications discussed.  Over the counter medications discussed.   Aware to come back in if with worsening symptoms or if no relief despite treatment plan  Patient voiced understanding and had no further questions.     MD Milly Bajwa MD  [facility]

## 2019-03-15 ENCOUNTER — ALLIED HEALTH/NURSE VISIT (OUTPATIENT)
Dept: NURSING | Facility: CLINIC | Age: 64
End: 2019-03-15
Payer: COMMERCIAL

## 2019-03-15 DIAGNOSIS — Z23 NEED FOR SHINGLES VACCINE: Primary | ICD-10-CM

## 2019-03-15 PROCEDURE — 90471 IMMUNIZATION ADMIN: CPT

## 2019-03-15 PROCEDURE — 99207 ZZC NO CHARGE NURSE ONLY: CPT

## 2019-03-15 PROCEDURE — 90750 HZV VACC RECOMBINANT IM: CPT

## 2019-08-16 ENCOUNTER — OFFICE VISIT (OUTPATIENT)
Dept: URGENT CARE | Facility: URGENT CARE | Age: 64
End: 2019-08-16
Payer: COMMERCIAL

## 2019-08-16 ENCOUNTER — ANCILLARY PROCEDURE (OUTPATIENT)
Dept: GENERAL RADIOLOGY | Facility: CLINIC | Age: 64
End: 2019-08-16
Attending: FAMILY MEDICINE
Payer: COMMERCIAL

## 2019-08-16 VITALS
DIASTOLIC BLOOD PRESSURE: 72 MMHG | HEART RATE: 64 BPM | SYSTOLIC BLOOD PRESSURE: 109 MMHG | TEMPERATURE: 97.6 F | OXYGEN SATURATION: 97 % | RESPIRATION RATE: 18 BRPM

## 2019-08-16 DIAGNOSIS — G89.29 CHRONIC PAIN OF LEFT KNEE: Primary | ICD-10-CM

## 2019-08-16 DIAGNOSIS — M25.562 CHRONIC PAIN OF LEFT KNEE: Primary | ICD-10-CM

## 2019-08-16 DIAGNOSIS — G89.29 CHRONIC PAIN OF LEFT KNEE: ICD-10-CM

## 2019-08-16 DIAGNOSIS — M25.562 CHRONIC PAIN OF LEFT KNEE: ICD-10-CM

## 2019-08-16 PROCEDURE — 99213 OFFICE O/P EST LOW 20 MIN: CPT | Performed by: FAMILY MEDICINE

## 2019-08-16 PROCEDURE — 73562 X-RAY EXAM OF KNEE 3: CPT | Mod: LT

## 2019-08-16 NOTE — PROGRESS NOTES
"Subjective     Magdaleno Springer is a 64 year old male who presents to clinic today for the following health issues:    HPI   Musculoskeletal problem/pain      Duration: 1 year, worsened today    Description  Location: left knee, Pt states that over the years he has been having issues on his left knee where he has small episode of his knee \"locking\" but within seconds it pops right in except this episode that started today knee will not pop into place and pain is getting worst would like knee to get popped into place    Intensity:  moderate    Accompanying signs and symptoms: none    History  Previous similar problem: YES  Previous evaluation:  none    Precipitating or alleviating factors:  Trauma or overuse: no   Aggravating factors include: none    Therapies tried and outcome: OTC analgesia         There is no problem list on file for this patient.    Past Surgical History:   Procedure Laterality Date     RETINAL REATTACHMENT         Social History     Tobacco Use     Smoking status: Former Smoker     Last attempt to quit:      Years since quittin.6     Smokeless tobacco: Never Used   Substance Use Topics     Alcohol use: Not on file     Family History   Problem Relation Age of Onset     Glaucoma No family hx of      Macular Degeneration No family hx of      Melanoma No family hx of      Skin Cancer No family hx of          Current Outpatient Medications   Medication Sig Dispense Refill     pramipexole (MIRAPEX) 1.5 MG tablet   3     atorvastatin (LIPITOR) 10 MG tablet   3     atorvastatin (LIPITOR) 80 MG tablet Take 10 mg by mouth       fluocinolone acetonide (DERMA SMOOTHE/FS BODY) 0.01 % external oil Apply topically daily as needed (itchiness) For the ears (Patient not taking: Reported on 2019) 118.28 mL 1     gabapentin (NEURONTIN) 100 MG capsule Take 100 mg by mouth       triamcinolone (KENALOG) 0.1 % external ointment Apply topically 2 times daily as needed for irritation (Patient not taking: " Reported on 8/16/2019) 30 g 0     valACYclovir (VALTREX) 1000 mg tablet Take 1 tablet by mouth       valACYclovir (VALTREX) 1000 mg tablet Take 1 tablet (1,000 mg) by mouth 3 times daily for 7 days 21 tablet 0     No Known Allergies  No lab results found.   BP Readings from Last 3 Encounters:   08/16/19 109/72   03/13/19 122/82   01/02/19 121/73    Wt Readings from Last 3 Encounters:   03/13/19 98.9 kg (218 lb)   01/02/19 92.5 kg (204 lb)               Reviewed and updated as needed this visit by Provider         Review of Systems   ROS COMP: Constitutional, HEENT, cardiovascular, pulmonary, gi and gu systems are negative, except as otherwise noted.      Objective    /72   Pulse 64   Temp 97.6  F (36.4  C) (Oral)   Resp 18   SpO2 97%   There is no height or weight on file to calculate BMI.  Physical Exam   GENERAL: alert and no distress  NECK: no adenopathy, no asymmetry, masses, or scars and thyroid normal to palpation  RESP: lungs clear to auscultation - no rales, rhonchi or wheezes  CV: regular rates and rhythm, normal S1 S2, no S3 or S4 and no murmur, click or rub  ABDOMEN: soft, nontender  MS: left knee: limited knee extension, no significant swelling or tenderness noted, no joint laxity, warmth or skin discoloration, gait antalgic, Homans sign negative, no pedal edema, pedal pulses 3+ bilaterally  NEURO: Normal strength and tone, mentation intact and speech normal    LEFT KNEE 3 VIEWS  8/16/2019 6:10 PM     HISTORY:  Chronic left knee pain.     COMPARISON:  None.     FINDINGS:  No fracture or osseous lesion is seen. The joint spaces are  well preserved. No soft tissue pathology is seen.                                                                      IMPRESSION:  Unremarkable examination.      Assessment & Plan     (M25.562,  G89.29) Chronic pain of left knee  (primary encounter diagnosis)  Comment: X-ray findings unremarkable.  Physical examination consistent with some locking and limited  extension.  Suspect ligamentous injury as underlying cause.  Knee splint provided for immobilization.  Orthopedic referral placed.  Suggested to continue well hydration, over-the-counter analgesia.  Patient understood and in agreement with above plan.  All questions answered.  Plan: XR Knee Left 3 Views             Iker Lan MD  [facility]

## 2019-08-19 ENCOUNTER — ANCILLARY PROCEDURE (OUTPATIENT)
Dept: MRI IMAGING | Facility: CLINIC | Age: 64
End: 2019-08-19
Attending: ORTHOPAEDIC SURGERY
Payer: COMMERCIAL

## 2019-08-19 ENCOUNTER — OFFICE VISIT (OUTPATIENT)
Dept: ORTHOPEDICS | Facility: CLINIC | Age: 64
End: 2019-08-19
Payer: COMMERCIAL

## 2019-08-19 VITALS
HEIGHT: 72 IN | DIASTOLIC BLOOD PRESSURE: 66 MMHG | BODY MASS INDEX: 27.78 KG/M2 | HEART RATE: 77 BPM | SYSTOLIC BLOOD PRESSURE: 117 MMHG | WEIGHT: 205.1 LBS

## 2019-08-19 DIAGNOSIS — G89.29 CHRONIC PAIN OF LEFT KNEE: ICD-10-CM

## 2019-08-19 DIAGNOSIS — G89.29 CHRONIC PAIN OF LEFT KNEE: Primary | ICD-10-CM

## 2019-08-19 DIAGNOSIS — M25.562 CHRONIC PAIN OF LEFT KNEE: ICD-10-CM

## 2019-08-19 DIAGNOSIS — M25.562 CHRONIC PAIN OF LEFT KNEE: Primary | ICD-10-CM

## 2019-08-19 PROCEDURE — 73721 MRI JNT OF LWR EXTRE W/O DYE: CPT | Mod: TC

## 2019-08-19 PROCEDURE — 99203 OFFICE O/P NEW LOW 30 MIN: CPT | Performed by: ORTHOPAEDIC SURGERY

## 2019-08-19 ASSESSMENT — MIFFLIN-ST. JEOR: SCORE: 1754.36

## 2019-08-19 ASSESSMENT — PAIN SCALES - GENERAL: PAINLEVEL: EXTREME PAIN (8)

## 2019-08-19 NOTE — LETTER
"    8/19/2019         RE: Magdaleno Springer  [address] [place] [address]        Dear Colleague,    Thank you for referring your patient, Magdaleno Springer, to the [facility]. Please see a copy of my visit note below.    CHIEF COMPLAINT:   Chief Complaint   Patient presents with     Left Knee - Pain     Onset: 8/16/19. Patient notes he was squatting on the floor and he tried to get up and he had pain. Prior to this he would bring his foot forward and his knee would snap and then feel bad. He has had snapping for about a year. Pain is anterior.      Knee Pain     No tx. His calf is starting to get sore. He has been limping.    .    Magdaleno Springer is seen today in the [facility] for evaluation of left knee pain at the request of Dr. Iker Lan MD    HISTORY OF PRESENT ILLNESS    Magdaleno Springer is a 64 year old male seen for evaluation of ongoing left knee pain with no known injury.   Pain has been present for 1 years, but worsened the past 3 days, starting 8/16/2019. He was squatting on the floor and tried to get up with pain. Has had problems with \"locking\" or \"snapping\" in the knee briefly and then \"pops back\" into place. He will sit with knee bent, it will \"lock\", then when he tries to straighten it out it will \"unlock\" with pain then get better. However this time feels like it won't unlock. Pain with putting any weight on the leg. Was seen in urgent care 8/16/2019 with xrays negative. Now his calf is sore from limping. Locates pain throughout the knee. Treatment has been ACE wrap, ice, ibuprofen. Mild swelling. Ok at rest..     Present symptoms: pain diffuse, severe pain, mild swelling, +locking.    Pain severity: 8/10  Frequency of symptoms: are constant  Exacerbating Factors: weight bearing  Relieving Factors: rest, sitting  Night Pain: Yes  Pain while at rest: Yes   Numbness or tingling: No   Patient has tried:     NSAIDS: Yes      Physical Therapy: No      " "Activity modification: Yes      Bracing: compression wrapping      Injections: No     Ice: Yes      Assistive device:  No      Other PMH:  has no past medical history of Basal cell carcinoma or Squamous cell carcinoma.  There is no problem list on file for this patient.      Surgical Hx:  has a past surgical history that includes Retinal reattachment.    Medications:   Current Outpatient Medications:      atorvastatin (LIPITOR) 10 MG tablet, , Disp: , Rfl: 3     atorvastatin (LIPITOR) 80 MG tablet, Take 10 mg by mouth, Disp: , Rfl:      fluocinolone acetonide (DERMA SMOOTHE/FS BODY) 0.01 % external oil, Apply topically daily as needed (itchiness) For the ears, Disp: 118.28 mL, Rfl: 1     gabapentin (NEURONTIN) 100 MG capsule, Take 100 mg by mouth, Disp: , Rfl:      pramipexole (MIRAPEX) 1.5 MG tablet, , Disp: , Rfl: 3     triamcinolone (KENALOG) 0.1 % external ointment, Apply topically 2 times daily as needed for irritation, Disp: 30 g, Rfl: 0     valACYclovir (VALTREX) 1000 mg tablet, Take 1 tablet by mouth, Disp: , Rfl:      valACYclovir (VALTREX) 1000 mg tablet, Take 1 tablet (1,000 mg) by mouth 3 times daily for 7 days, Disp: 21 tablet, Rfl: 0    Current Facility-Administered Medications:      ofloxacin (OCUFLOX) 0.3 % ophthalmic solution 1 drop, 1 drop, Right Eye, 4x Daily, Larry Latham, CLEMENTINA    Allergies: No Known Allergies    Social Hx: retired.   reports that he quit smoking about 41 years ago. He has never used smokeless tobacco.    Family Hx: family history is not on file.    REVIEW OF SYSTEMS: 10 point ROS neg other than the symptoms noted above in the HPI and PMH. Notables include  CONSTITUTIONAL:NEGATIVE for fever, chills, change in weight  INTEGUMENTARY/SKIN: NEGATIVE for worrisome rashes, moles or lesions  MUSCULOSKELETAL:See HPI above  NEURO: NEGATIVE for weakness, dizziness or paresthesias    PHYSICAL EXAM:  /66   Pulse 77   Ht 1.822 m (5' 11.75\")   Wt 93 kg (205 lb 1.6 oz)   BMI 28.01 " kg/m      GENERAL APPEARANCE: healthy, alert, no distress; accompanied by his wife.  SKIN: no suspicious lesions or rashes  NEURO: Normal strength and tone, mentation intact and speech normal  PSYCH:  mentation appears normal and affect normal, not anxious  RESPIRATORY: No increased work of breathing.  HANDS: no clubbing, nail pitting  LYMPH: no palpable popliteal lymphadenopathy.    BILATERAL LOWER EXTREMITIES:  Gait: antalgic favoring left, does not fully extend the knee with stance.  Alignment: neutral.  No gross deformities or masses.  No Quad atrophy, strength normal.  Intact sensation deep peroneal nerve, superficial peroneal nerve, med/lat tibial nerve, sural nerve, saphenous nerve  Intact EHL, EDL, TA, FHL, GS, quadriceps hamstrings and hip flexors  Toes warm and well perfused, brisk capillary refill. Palpable 2+ dp pulses.  Bilateral calf soft and nttp or squeeze.  DTRs: achilles 2+, patella 2+.  Edema: none    LEFT KNEE EXAM:    Skin: intact, no ecchymosis or erythema  ROM: full extension to 135+ flexion; discomfort with extremes.  Tight hamstrings on straight leg raise.  Effusion: trace  Tender: medial joint line  nontender to palpation lat joint line, anterior or posterior knee  McMurrays: positive medially for pain.    MCL: stable, and non-painful at both 0 and 30 degrees knee flexion  Varus stress: stable, and non-painful at both 0 and 30 degrees knee flexion  Lachmans: neg, firm endpoint  Posterior Drawer stable  Patellofemoral joint:                Apprehension: negative              Crepitations: minimal    RIGHT KNEE EXAM:    Skin: intact, no ecchymosis or erythema  ROM: full extension to 140+ flexion  Tight hamstrings on straight leg raise.  Effusion: none  Tender: NTTP med/lat joint line, anterior or posterior knee  McMurrays: negative    MCL: stable, and non-painful at both 0 and 30 degrees knee flexion  Varus stress: stable, and non-painful at both 0 and 30 degrees knee flexion  Lachmans: neg,  "firm endpoint  Posterior Drawer stable  Patellofemoral joint:                Apprehension: negative              Crepitations: minimal    X-RAY:  3 views left knee from 8/16/2019 were reviewed in clinic today. On my review, no obvious fractures or dislocations. Slight medial and lateral patello-femoral narrowing. Tiny patello-femoral osteophytes.       ASSESSMENT/PLAN: Magdaleno Springer is a 64 year old male with acute on chronic left knee pain.     images reviewed, no obvious abnormality. At most minimal osteoarthritis  Concern for displaced meniscus tear causing the knee to \"lock\"  Recommend MRI to further evaluate  Rest, ice, elevate, compression wrapping, activity modification  Will call regarding MRI findings, treatment options.      Robbin Alegria M.D., M.S.  Dept. of Orthopaedic Surgery  [facility]        Again, thank you for allowing me to participate in the care of your patient.        Sincerely,        Robbin Alegria MD    "

## 2019-08-19 NOTE — PROGRESS NOTES
"CHIEF COMPLAINT:   Chief Complaint   Patient presents with     Left Knee - Pain     Onset: 8/16/19. Patient notes he was squatting on the floor and he tried to get up and he had pain. Prior to this he would bring his foot forward and his knee would snap and then feel bad. He has had snapping for about a year. Pain is anterior.      Knee Pain     No tx. His calf is starting to get sore. He has been limping.    .    Magdaleno Springer is seen today in the [facility] for evaluation of left knee pain at the request of Dr. Iker Lan MD    HISTORY OF PRESENT ILLNESS    Magdaleno Springer is a 64 year old male seen for evaluation of ongoing left knee pain with no known injury.   Pain has been present for 1 years, but worsened the past 3 days, starting 8/16/2019. He was squatting on the floor and tried to get up with pain. Has had problems with \"locking\" or \"snapping\" in the knee briefly and then \"pops back\" into place. He will sit with knee bent, it will \"lock\", then when he tries to straighten it out it will \"unlock\" with pain then get better. However this time feels like it won't unlock. Pain with putting any weight on the leg. Was seen in urgent care 8/16/2019 with xrays negative. Now his calf is sore from limping. Locates pain throughout the knee. Treatment has been ACE wrap, ice, ibuprofen. Mild swelling. Ok at rest..     Present symptoms: pain diffuse, severe pain, mild swelling, +locking.    Pain severity: 8/10  Frequency of symptoms: are constant  Exacerbating Factors: weight bearing  Relieving Factors: rest, sitting  Night Pain: Yes  Pain while at rest: Yes   Numbness or tingling: No   Patient has tried:     NSAIDS: Yes      Physical Therapy: No      Activity modification: Yes      Bracing: compression wrapping      Injections: No     Ice: Yes      Assistive device:  No      Other PMH:  has no past medical history of Basal cell carcinoma or Squamous cell carcinoma.  There is no problem list on file " "for this patient.      Surgical Hx:  has a past surgical history that includes Retinal reattachment.    Medications:   Current Outpatient Medications:      atorvastatin (LIPITOR) 10 MG tablet, , Disp: , Rfl: 3     atorvastatin (LIPITOR) 80 MG tablet, Take 10 mg by mouth, Disp: , Rfl:      fluocinolone acetonide (DERMA SMOOTHE/FS BODY) 0.01 % external oil, Apply topically daily as needed (itchiness) For the ears, Disp: 118.28 mL, Rfl: 1     gabapentin (NEURONTIN) 100 MG capsule, Take 100 mg by mouth, Disp: , Rfl:      pramipexole (MIRAPEX) 1.5 MG tablet, , Disp: , Rfl: 3     triamcinolone (KENALOG) 0.1 % external ointment, Apply topically 2 times daily as needed for irritation, Disp: 30 g, Rfl: 0     valACYclovir (VALTREX) 1000 mg tablet, Take 1 tablet by mouth, Disp: , Rfl:      valACYclovir (VALTREX) 1000 mg tablet, Take 1 tablet (1,000 mg) by mouth 3 times daily for 7 days, Disp: 21 tablet, Rfl: 0    Current Facility-Administered Medications:      ofloxacin (OCUFLOX) 0.3 % ophthalmic solution 1 drop, 1 drop, Right Eye, 4x Daily, Larry Latham, CLEMENT[place]    Allergies: No Known Allergies    Social Hx: retired.   reports that he quit smoking about 41 years ago. He has never used smokeless tobacco.    Family Hx: family history is not on file.    REVIEW OF SYSTEMS: 10 point ROS neg other than the symptoms noted above in the HPI and PMH. Notables include  CONSTITUTIONAL:NEGATIVE for fever, chills, change in weight  INTEGUMENTARY/SKIN: NEGATIVE for worrisome rashes, moles or lesions  MUSCULOSKELETAL:See HPI above  NEURO: NEGATIVE for weakness, dizziness or paresthesias    PHYSICAL EXAM:  /66   Pulse 77   Ht 1.822 m (5' 11.75\")   Wt 93 kg (205 lb 1.6 oz)   BMI 28.01 kg/m     GENERAL APPEARANCE: healthy, alert, no distress; accompanied by his wife.  SKIN: no suspicious lesions or rashes  NEURO: Normal strength and tone, mentation intact and speech normal  PSYCH:  mentation appears normal and affect normal, not " anxious  RESPIRATORY: No increased work of breathing.  HANDS: no clubbing, nail pitting  LYMPH: no palpable popliteal lymphadenopathy.    BILATERAL LOWER EXTREMITIES:  Gait: antalgic favoring left, does not fully extend the knee with stance.  Alignment: neutral.  No gross deformities or masses.  No Quad atrophy, strength normal.  Intact sensation deep peroneal nerve, superficial peroneal nerve, med/lat tibial nerve, sural nerve, saphenous nerve  Intact EHL, EDL, TA, FHL, GS, quadriceps hamstrings and hip flexors  Toes warm and well perfused, brisk capillary refill. Palpable 2+ dp pulses.  Bilateral calf soft and nttp or squeeze.  DTRs: achilles 2+, patella 2+.  Edema: none    LEFT KNEE EXAM:    Skin: intact, no ecchymosis or erythema  ROM: full extension to 135+ flexion; discomfort with extremes.  Tight hamstrings on straight leg raise.  Effusion: trace  Tender: medial joint line  nontender to palpation lat joint line, anterior or posterior knee  McMurrays: positive medially for pain.    MCL: stable, and non-painful at both 0 and 30 degrees knee flexion  Varus stress: stable, and non-painful at both 0 and 30 degrees knee flexion  Lachmans: neg, firm endpoint  Posterior Drawer stable  Patellofemoral joint:                Apprehension: negative              Crepitations: minimal    RIGHT KNEE EXAM:    Skin: intact, no ecchymosis or erythema  ROM: full extension to 140+ flexion  Tight hamstrings on straight leg raise.  Effusion: none  Tender: NTTP med/lat joint line, anterior or posterior knee  McMurrays: negative    MCL: stable, and non-painful at both 0 and 30 degrees knee flexion  Varus stress: stable, and non-painful at both 0 and 30 degrees knee flexion  Lachmans: neg, firm endpoint  Posterior Drawer stable  Patellofemoral joint:                Apprehension: negative              Crepitations: minimal    X-RAY:  3 views left knee from 8/16/2019 were reviewed in clinic today. On my review, no obvious fractures or  "dislocations. Slight medial and lateral patello-femoral narrowing. Tiny patello-femoral osteophytes.       ASSESSMENT/PLAN: Magdaleno Springer is a 64 year old male with acute on chronic left knee pain.     images reviewed, no obvious abnormality. At most minimal osteoarthritis  Concern for displaced meniscus tear causing the knee to \"lock\"  Recommend MRI to further evaluate  Rest, ice, elevate, compression wrapping, activity modification  Will call regarding MRI findings, treatment options.      Robbin Alegria M.D., M.S.  Dept. of Orthopaedic Surgery  [facility]      "

## 2019-08-20 ENCOUNTER — MYC MEDICAL ADVICE (OUTPATIENT)
Dept: ORTHOPEDICS | Facility: CLINIC | Age: 64
End: 2019-08-20

## 2019-08-21 ENCOUNTER — TELEPHONE (OUTPATIENT)
Dept: ORTHOPEDICS | Facility: CLINIC | Age: 64
End: 2019-08-21

## 2019-08-21 ENCOUNTER — OFFICE VISIT (OUTPATIENT)
Dept: FAMILY MEDICINE | Facility: CLINIC | Age: 64
End: 2019-08-21
Payer: COMMERCIAL

## 2019-08-21 VITALS
BODY MASS INDEX: 28 KG/M2 | HEART RATE: 66 BPM | SYSTOLIC BLOOD PRESSURE: 121 MMHG | OXYGEN SATURATION: 98 % | TEMPERATURE: 97.7 F | RESPIRATION RATE: 16 BRPM | WEIGHT: 205 LBS | DIASTOLIC BLOOD PRESSURE: 71 MMHG

## 2019-08-21 DIAGNOSIS — G25.81 RESTLESS LEG SYNDROME: ICD-10-CM

## 2019-08-21 DIAGNOSIS — Z01.818 PREOP GENERAL PHYSICAL EXAM: Primary | ICD-10-CM

## 2019-08-21 DIAGNOSIS — S83.242A ACUTE MEDIAL MENISCUS TEAR OF LEFT KNEE, INITIAL ENCOUNTER: Primary | ICD-10-CM

## 2019-08-21 DIAGNOSIS — M25.562 ACUTE PAIN OF LEFT KNEE: ICD-10-CM

## 2019-08-21 PROCEDURE — 93000 ELECTROCARDIOGRAM COMPLETE: CPT | Performed by: INTERNAL MEDICINE

## 2019-08-21 PROCEDURE — 99214 OFFICE O/P EST MOD 30 MIN: CPT | Performed by: INTERNAL MEDICINE

## 2019-08-21 RX ORDER — HYDROCODONE BITARTRATE AND ACETAMINOPHEN 5; 325 MG/1; MG/1
1 TABLET ORAL 3 TIMES DAILY PRN
Qty: 15 TABLET | Refills: 0 | Status: SHIPPED | OUTPATIENT
Start: 2019-08-21

## 2019-08-21 NOTE — TELEPHONE ENCOUNTER
Type of surgery: left knee arthroscopy,meniscal and chondral debridement  CPT 20978  Acute medial meniscus tear of left knee, initial encounter [S83.242A]  - Primary   Location of surgery: [facility]  Date and time of surgery: 8-27-19  1:30pm  Surgeon: Dr Alegria  Pre-Op Appt Date: 8-21-19  Post-Op Appt Date: 9-9-19   Packet sent out: Yes  Pre-cert/Authorization completed:    Per Availity:  Procedure Code 1  26268    Reference Number  [id number]  Status  NO AUTH REQUIRED    Date: 08/21/2019    Thank you,   Katlyn Garcia   Referral Department  [phone]

## 2019-08-21 NOTE — PROGRESS NOTES
[facility]  [address] [facility] [address]  [phone]  Dept: [phone]    PRE-OP EVALUATION:  Today's date: 2019    Magdaleno Springer (: 1955) presents for pre-operative evaluation assessment as requested by Dr. Alegria.  He requires evaluation and anesthesia risk assessment prior to undergoing surgery/procedure for treatment of removal of left knee meniscus .    Fax number for surgical facility: [facility]  Primary Physician: Nadia Davenport  Type of Anesthesia Anticipated: General    Patient has a Health Care Directive or Living Will:  NO    Preop Questions 2019   Who is doing your surgery? Dr Alegria   What are you having done? Arthroscopic removal of left knee meniscus   Date of Surgery/Procedure: 2019   Facility or Hospital where procedure/surgery will be performed: [facility], [place], [place]   1.  Do you have a history of Heart attack, stroke, stent, coronary bypass surgery, or other heart surgery? No   2.  Do you ever have any pain or discomfort in your chest? No   3.  Do you have a history of  Heart Failure? No   4.   Are you troubled by shortness of breath when:  walking on a level surface, or up a slight hill, or at night? No   5.  Do you currently have a cold, bronchitis or other respiratory infection? No   6.  Do you have a cough, shortness of breath, or wheezing? No   7.  Do you sometimes get pains in the calves of your legs when you walk? No   8. Do you or anyone in your family have previous history of blood clots? No   9.  Do you or does anyone in your family have a serious bleeding problem such as prolonged bleeding following surgeries or cuts? No   10. Have you ever had problems with anemia or been told to take iron pills? No   11. Have you had any abnormal blood loss such as black, tarry or bloody stools? No   12. Have you ever had a blood transfusion? No   13. Have you or any of your relatives ever had problems  with anesthesia? No   14. Do you have sleep apnea, excessive snoring or daytime drowsiness? No   15. Do you have any prosthetic heart valves? No   16. Do you have prosthetic joints? No         HPI:     HPI related to upcoming procedure: about a year ago started having some knee issues and pain, especially when would squat or bend it.  Recently has been having more pain so saw ortho and had MRI and now scheduled for surgery next week.  Some swelling.  He has been taking ibuprofen fairly regularly for the pain.  Has some trouble walking on it over the past week.    See problem list for active medical problems.  Problems all longstanding and stable, except as noted/documented.  See ROS for pertinent symptoms related to these conditions.      MEDICAL HISTORY:     Patient Active Problem List    Diagnosis Date Noted     Restless leg syndrome 2019     Priority: Medium      Past Medical History:   Diagnosis Date     Hyperlipidemia      Restless leg syndrome      Past Surgical History:   Procedure Laterality Date     RETINAL REATTACHMENT       Current Outpatient Medications   Medication Sig Dispense Refill     HYDROcodone-acetaminophen (NORCO) 5-325 MG tablet Take 1 tablet by mouth 3 times daily as needed for pain 15 tablet 0     pramipexole (MIRAPEX) 1.5 MG tablet   3     OTC products: NSAIDS prn.      No Known Allergies   Latex Allergy: NO    Social History     Tobacco Use     Smoking status: Former Smoker     Last attempt to quit: 1978     Years since quittin.6     Smokeless tobacco: Never Used   Substance Use Topics     Alcohol use: Not on file     History   Drug Use Not on file       REVIEW OF SYSTEMS:   CONSTITUTIONAL: NEGATIVE for fever, chills, change in weight  INTEGUMENTARY/SKIN: NEGATIVE for worrisome rashes, moles or lesions  EYES: NEGATIVE for vision changes or irritation  ENT/MOUTH: NEGATIVE for ear, mouth and throat problems  RESP: NEGATIVE for significant cough or SOB  BREAST: NEGATIVE for masses,  tenderness or discharge  CV: NEGATIVE for chest pain, palpitations or peripheral edema  GI: NEGATIVE for nausea, abdominal pain, heartburn, or change in bowel habits  : NEGATIVE for frequency, dysuria, or hematuria  MUSCULOSKELETAL: NEGATIVE for significant arthralgias or myalgia  NEURO: NEGATIVE for weakness, dizziness or paresthesias  ENDOCRINE: NEGATIVE for temperature intolerance, skin/hair changes  HEME: NEGATIVE for bleeding problems  PSYCHIATRIC: NEGATIVE for changes in mood or affect    EXAM:   /71   Pulse 66   Temp 97.7  F (36.5  C) (Oral)   Resp 16   Wt 93 kg (205 lb)   SpO2 98%   BMI 28.00 kg/m      GENERAL APPEARANCE: healthy, alert and no distress     EYES: EOMI,  PERRL     HENT: ear canals and TM's normal and nose and mouth without ulcers or lesions     NECK: no adenopathy, no asymmetry, masses, or scars and thyroid normal to palpation     RESP: lungs clear to auscultation - no rales, rhonchi or wheezes     CV: regular rates and rhythm, normal S1 S2, no S3 or S4 and no murmur, click or rub     ABDOMEN:  soft, nontender, no HSM or masses and bowel sounds normal     MS: extremities normal- except left knee with mild medial edema and medial joint line tenderness, and some limited rom with flexion and extension due to pain.     SKIN: no suspicious lesions or rashes     NEURO: Normal strength and tone, sensory exam grossly normal, mentation intact and speech normal     PSYCH: mentation appears normal. and affect normal/bright     LYMPHATICS: No cervical adenopathy    DIAGNOSTICS:   EKG: appears normal, NSR, normal axis, normal intervals, no acute ST/T changes c/w ischemia, no LVH by voltage criteria, there are no prior tracings available    No results for input(s): HGB, PLT, INR, NA, POTASSIUM, CR, A1C in the last 63022 hours.     IMPRESSION:   Reason for surgery/procedure: left knee pain  Diagnosis/reason for consult: reduce risk of complication of surgery    The proposed surgical procedure  is considered INTERMEDIATE risk.    REVISED CARDIAC RISK INDEX  The patient has the following serious cardiovascular risks for perioperative complications such as (MI, PE, VFib and 3  AV Block):  No serious cardiac risks    The patient has the following additional risks for perioperative complications:  No identified additional risks      ICD-10-CM    1. Preop general physical exam Z01.818 EKG 12-lead complete w/read - Clinics   2. Acute pain of left knee M25.562 HYDROcodone-acetaminophen (NORCO) 5-325 MG tablet   3. Restless leg syndrome G25.81        RECOMMENDATIONS:     --If needed, consult hospital rounder / IM to assist post-op medical management    --Patient is to take his evening medications the evening before surgery, but is to hold any morning medication  --Patient to avoid nsaids and aspirin between now and day of surgery.  Because he has had quite a bit of pain which tylenol does not relieve, will rx vicodin for pt to use prn for more severe pain. Advised that vicodin can make drowsy or altered and is not to drive, operate machinery or consume alcohol or street drugs when taking. Advised pt not to take vicodin the day of surgery.  --Patient to contact surgeon if becomes ill before surgery    APPROVAL GIVEN to proceed with proposed procedure, without further diagnostic evaluation       Signed Electronically by: Katlyn Grayson MD    Copy of this evaluation report is provided to requesting physician.    [facility] Preop Guidelines    Revised Cardiac Risk Index

## 2019-08-21 NOTE — TELEPHONE ENCOUNTER
Called and spoke to Magdaleno regarding his MRI.    * discussed findings with Magdaleno, what appears to be a displaced medial meniscal tear on MRI, as well as some underlying arthritic changes, which is consistent with symptoms and physical examination findings.     * Discussed treatment options including nonoperative treatment with continued rest, ice, elevation, activity modification, NSAIDS and Physical Therapy, bracing and potential injections versus surgical treatment with arthroscopy and meniscal repair versus debridement, possible chondral debridement. Risks and benefits of each discussed in detail.  * in the setting of underlying chondrosis, predictability of arthroscopy is uncertain, unless mechanical symptoms present due to the meniscus tear.    * surgical risks discussed: bleeding, infection, pain, scar, damage to adjacent structures (nerve, vessels, cartilage), stiffness, post-traumatic arthritis, failure to relieve symptoms, recurrence of symptoms, blood clots (DVT), pulmonary emolism, risks of anesthesia and death. This surgery is not intended nor expected to alleviate arthritic pain symptoms, nor will it treat or correct underlying arthritic changes. Arthritis and symptoms related to arthritis could worsen with arthroscopy and meniscal and/or chondral debridement. Patient understands.    * understanding the risks of surgery, patient elected to proceed with arthroscopy  * plan: LEFT knee arthroscopy with partial meniscal debridement versus repair, possible chondral debridement, outpatient surgery  * will arrange at a time in future mutual convenience, tentatively next week Tuesday at [facility].  * will need H+P from PCP prior to surgery  * patient will be on ASA x2 weeks postoperative, as well as use of TEDs, crutches  * plan Physical Therapy to start 1-2 weeks postoperative, to be determined at postoperative visit.  * return to clinic 2 week postop for wound check, sooner as needed.  * all questions  addressed and answered prior to discharge from clinic today.  * patient to call if any questions or concerns in the meantime.      He was appreciative of the call.    Robbin Alegria M.D., M.S.  Dept. of Orthopaedic Surgery  [facility]

## 2019-09-09 ENCOUNTER — OFFICE VISIT (OUTPATIENT)
Dept: ORTHOPEDICS | Facility: CLINIC | Age: 64
End: 2019-09-09
Payer: COMMERCIAL

## 2019-09-09 VITALS
HEIGHT: 72 IN | SYSTOLIC BLOOD PRESSURE: 138 MMHG | RESPIRATION RATE: 16 BRPM | WEIGHT: 205 LBS | DIASTOLIC BLOOD PRESSURE: 79 MMHG | BODY MASS INDEX: 27.77 KG/M2

## 2019-09-09 DIAGNOSIS — Z98.890 S/P ARTHROSCOPY OF LEFT KNEE: Primary | ICD-10-CM

## 2019-09-09 PROCEDURE — 99024 POSTOP FOLLOW-UP VISIT: CPT | Performed by: ORTHOPAEDIC SURGERY

## 2019-09-09 ASSESSMENT — PAIN SCALES - GENERAL: PAINLEVEL: NO PAIN (1)

## 2019-09-09 ASSESSMENT — MIFFLIN-ST. JEOR: SCORE: 1753.9

## 2019-09-09 NOTE — LETTER
9/9/2019         RE: Magdaleno Springer  [address]  [place] [address]        Dear Colleague,    Thank you for referring your patient, Magdaleno Springer, to the [facility]. Please see a copy of my visit note below.    Chief Complaint   Patient presents with     Left Knee - Surgical Followup     Left knee arthroscopy. Medial meniscus bucket-handle type tear. DOS: 8/27/19. 2 weeks. Doing well. The locking in the knee is gone. Still some post-op discomfort.       SURGERY:  ([facility] )  left knee arthroscopic partial medial meniscectomy  Left knee arthroscopic medial plica resection.  Left knee arthroscopic shaving chondroplasty medial femoral condyle.    DATE OF SURGERY: 8/27/2019.      HISTORY OF PRESENT ILLNESS:  Magdaleno Springer is a 64 year old male seen for postoperative evaluation of a left knee arthroscopy and medial meniscus debridement for displaced medial meniscus tear. Surgery occurred 2 weeks ago. Returns today stating doing well. Pain has been improving. No problems with the surgical wounds. Denies fevers chills or night sweats. No associated numbness or tingling. Has been doing home exercise program since surgery as recommended. Maybe overdoing it. Taking aspirin daily.    OR FINDINGS:  Suprapatellar pouch: mild synovitis, trace effusion.  Patella: grade 2 chondrosis.  Femoral trochlea: grade 1 chondrosis.  Medial gutter: no loose bodies.  Lateral gutter: no loose bodies.  Notch: intact ACL, displaced medial meniscus tear flap.  Lateral compartment: intact lateral meniscus, grade 1-2 chondrosis.  Medial compartment: complex, displaced bucket handle medial meniscus tear, grade1-2 chondrosis, subcentimeter area of grade 4 medial femoral condyle weightbearing portion.      Past Medical History:   Diagnosis Date     Hyperlipidemia      Restless leg syndrome        Past Surgical History:   Procedure Laterality Date     RETINAL REATTACHMENT         Medications:   Current  "Outpatient Medications:      HYDROcodone-acetaminophen (NORCO) 5-325 MG tablet, Take 1 tablet by mouth 3 times daily as needed for pain, Disp: 15 tablet, Rfl: 0     pramipexole (MIRAPEX) 1.5 MG tablet, , Disp: , Rfl: 3    Current Facility-Administered Medications:      ofloxacin (OCUFLOX) 0.3 % ophthalmic solution 1 drop, 1 drop, Right Eye, 4x Daily, Larry Latham, OD    Allergies: No Known Allergies    REVIEW OF SYSTEMS:   CONSTITUTIONAL:NEGATIVE for fever, chills, night sweats  INTEGUMENTARY/SKIN: NEGATIVE for worrisome wound problems or redness.  MUSCULOSKELETAL:See HPI above  NEURO: NEGATIVE for weakness, dizziness or paresthesias    PHYSICAL EXAM:  /79   Resp 16   Ht 1.822 m (5' 11.75\")   Wt 93 kg (205 lb)   BMI 28.00 kg/m      GENERAL APPEARANCE: healthy, alert, no distress  SKIN: no suspicious lesions or rashes  NEURO: Normal strength and tone, mentation intact and speech normal  PSYCH:  mentation appears normal and affect normal, not anxious  RESPIRATORY: No increased work of breathing.    left  LOWER EXTREMITY:  Gait: normal  No Quad atrophy, strength normal.  Intact sensation deep peroneal nerve, superficial peroneal nerve, med/lat tibial nerve, sural nerve, saphenous nerve  Intact EHL, EDL, TA, FHL, GS, quadriceps hamstrings and hip flexors  Toes warm and well perfused, brisk capillary refill. Palpable 2+ dp pulses.  calf soft and nttp or squeeze.  Edema: none    left  KNEE EXAM:    Skin: intact, no ecchymosis or erythema  Incisions: skin edges well approximated, sutures in place. Dry. No erythema.  ROM: full extension to 135 flexion  Effusion: trace  Tender: NTTP med/lat joint line, anterior or posterior knee           X-RAY: none indicated.      Impression: Magdaleno Springer is a 64 year old male 2 weeks status post left knee arthroscopy and medial meniscus debridement, doing well.       Plan: routine postoperative knee arthroscopy  * suture removal    * WB status: weight bearing as tolerated. " Cautioned not to overdo it too quickly. Increased activities too soon will lead to more swelling of the knee and leg, more pain, slower recovery. Expect 4-6 weeks.    * Rest  * Activity modification - avoid activities that aggravate symptoms.  * NSAIDS - regular use for inflammation, with food, as long as no contra-indications. Please discuss with pcp if needed.  * Ice twice daily to three times daily as needed.  * Compression wrap as needed.  * Elevation of extremity to reduce swelling as needed.  * home exercise program for strengthening, stretching and range of motion exercises, effusion control.  * Tylenol as needed for pain  * return to clinic as needed.      * We did also discuss that based on the amount of arthritic changes seen at the time of surgery, may have continued knee pain due to the arthritis. Once recovered from the knee arthroscopy, and arthritic symptoms persist, consider full treatment of arthritis starting with Physical Therapy and injections, NSAIDS, activity modification, bracing.      Robbin Alegria M.D., M.S.  Dept. of Orthopaedic Surgery  [facility]    Again, thank you for allowing me to participate in the care of your patient.        Sincerely,        Robbin Alegria MD

## 2019-09-09 NOTE — PROGRESS NOTES
Chief Complaint   Patient presents with     Left Knee - Surgical Followup     Left knee arthroscopy. Medial meniscus bucket-handle type tear. DOS: 8/27/19. 2 weeks. Doing well. The locking in the knee is gone. Still some post-op discomfort.       SURGERY:  ([facility] )  left knee arthroscopic partial medial meniscectomy  Left knee arthroscopic medial plica resection.  Left knee arthroscopic shaving chondroplasty medial femoral condyle.    DATE OF SURGERY: 8/27/2019.      HISTORY OF PRESENT ILLNESS:  Magdaleno Springer is a 64 year old male seen for postoperative evaluation of a left knee arthroscopy and medial meniscus debridement for displaced medial meniscus tear. Surgery occurred 2 weeks ago. Returns today stating doing well. Pain has been improving. No problems with the surgical wounds. Denies fevers chills or night sweats. No associated numbness or tingling. Has been doing home exercise program since surgery as recommended. Maybe overdoing it. Taking aspirin daily.    OR FINDINGS:  Suprapatellar pouch: mild synovitis, trace effusion.  Patella: grade 2 chondrosis.  Femoral trochlea: grade 1 chondrosis.  Medial gutter: no loose bodies.  Lateral gutter: no loose bodies.  Notch: intact ACL, displaced medial meniscus tear flap.  Lateral compartment: intact lateral meniscus, grade 1-2 chondrosis.  Medial compartment: complex, displaced bucket handle medial meniscus tear, grade1-2 chondrosis, subcentimeter area of grade 4 medial femoral condyle weightbearing portion.      Past Medical History:   Diagnosis Date     Hyperlipidemia      Restless leg syndrome        Past Surgical History:   Procedure Laterality Date     RETINAL REATTACHMENT         Medications:   Current Outpatient Medications:      HYDROcodone-acetaminophen (NORCO) 5-325 MG tablet, Take 1 tablet by mouth 3 times daily as needed for pain, Disp: 15 tablet, Rfl: 0     pramipexole (MIRAPEX) 1.5 MG tablet, , Disp: , Rfl: 3    Current  "Facility-Administered Medications:      ofloxacin (OCUFLOX) 0.3 % ophthalmic solution 1 drop, 1 drop, Right Eye, 4x Daily, Larry Latham, OD    Allergies: No Known Allergies    REVIEW OF SYSTEMS:   CONSTITUTIONAL:NEGATIVE for fever, chills, night sweats  INTEGUMENTARY/SKIN: NEGATIVE for worrisome wound problems or redness.  MUSCULOSKELETAL:See HPI above  NEURO: NEGATIVE for weakness, dizziness or paresthesias    PHYSICAL EXAM:  /79   Resp 16   Ht 1.822 m (5' 11.75\")   Wt 93 kg (205 lb)   BMI 28.00 kg/m     GENERAL APPEARANCE: healthy, alert, no distress  SKIN: no suspicious lesions or rashes  NEURO: Normal strength and tone, mentation intact and speech normal  PSYCH:  mentation appears normal and affect normal, not anxious  RESPIRATORY: No increased work of breathing.    left  LOWER EXTREMITY:  Gait: normal  No Quad atrophy, strength normal.  Intact sensation deep peroneal nerve, superficial peroneal nerve, med/lat tibial nerve, sural nerve, saphenous nerve  Intact EHL, EDL, TA, FHL, GS, quadriceps hamstrings and hip flexors  Toes warm and well perfused, brisk capillary refill. Palpable 2+ dp pulses.  calf soft and nttp or squeeze.  Edema: none    left  KNEE EXAM:    Skin: intact, no ecchymosis or erythema  Incisions: skin edges well approximated, sutures in place. Dry. No erythema.  ROM: full extension to 135 flexion  Effusion: trace  Tender: NTTP med/lat joint line, anterior or posterior knee           X-RAY: none indicated.      Impression: Magdaleno Springer is a 64 year old male 2 weeks status post left knee arthroscopy and medial meniscus debridement, doing well.       Plan: routine postoperative knee arthroscopy  * suture removal    * WB status: weight bearing as tolerated. Cautioned not to overdo it too quickly. Increased activities too soon will lead to more swelling of the knee and leg, more pain, slower recovery. Expect 4-6 weeks.    * Rest  * Activity modification - avoid activities that " aggravate symptoms.  * NSAIDS - regular use for inflammation, with food, as long as no contra-indications. Please discuss with pcp if needed.  * Ice twice daily to three times daily as needed.  * Compression wrap as needed.  * Elevation of extremity to reduce swelling as needed.  * home exercise program for strengthening, stretching and range of motion exercises, effusion control.  * Tylenol as needed for pain  * return to clinic as needed.      * We did also discuss that based on the amount of arthritic changes seen at the time of surgery, may have continued knee pain due to the arthritis. Once recovered from the knee arthroscopy, and arthritic symptoms persist, consider full treatment of arthritis starting with Physical Therapy and injections, NSAIDS, activity modification, bracing.      Robbin Alegria M.D., M.S.  Dept. of Orthopaedic Surgery  [facility]

## 2020-03-11 ENCOUNTER — HEALTH MAINTENANCE LETTER (OUTPATIENT)
Age: 65
End: 2020-03-11

## 2020-05-06 ENCOUNTER — NURSE TRIAGE (OUTPATIENT)
Dept: NURSING | Facility: CLINIC | Age: 65
End: 2020-05-06

## 2020-05-06 NOTE — TELEPHONE ENCOUNTER
"Chest tightness/past 1 week / in the breast bone area/ it never goes away/is worried about it/rates as a \"5/10\" no radiation of pain and no shortness of breath/has family history of heart disease/advised to go into the er advised not to drive/ he says he will call his wife and will be seen Ton Figueroa RN -[phone]    Additional Information    Negative: Severe difficulty breathing (e.g., struggling for each breath, speaks in single words)    Negative: Passed out (i.e., fainted, collapsed and was not responding)    Negative: Chest pain lasting longer than 5 minutes and ANY of the following:* Over 50 years old* Over 30 years old and at least one cardiac risk factor (i.e., high blood pressure, diabetes, high cholesterol, obesity, smoker or strong family history of heart disease)* Pain is crushing, pressure-like, or heavy * Took nitroglycerin and chest pain was not relieved* History of heart disease (i.e., angina, heart attack, bypass surgery, angioplasty, CHF)    Negative: Visible sweat on face or sweat dripping down face    Negative: Sounds like a life-threatening emergency to the triager    Negative: Followed an injury to chest    Negative: SEVERE chest pain    Negative: Pain also present in shoulder(s) or arm(s) or jaw    Negative: Difficulty breathing    Negative: Cocaine use within last 3 days    Negative: History of prior 'blood clot' in leg or lungs (i.e., deep vein thrombosis, pulmonary embolism)    Negative: Recent illness requiring prolonged bed rest (i.e., immobilization)    Negative: Hip or leg fracture in past 2 months (e.g, or had cast on leg or ankle)    Negative: Major surgery in the past month    Negative: Recent long-distance travel with prolonged time in car, bus, plane, or train (i.e., within past 2 weeks; 6 or more hours duration)    Negative: Heart beating irregularly or very rapidly    Negative: Chest pain lasting longer than 5 minutes    Negative: Intermittent chest pain and pain has been " increasing in severity or frequency    Negative: Dizziness or lightheadedness    Negative: Coughing up blood    Negative: Patient sounds very sick or weak to the triager    Negative: Fever > 100.5 F (38.1 C)    Negative: Intermittent chest pains persist > 3 days    Patient wants to be seen    All other patients with chest pain    Protocols used: CHEST PAIN-A-OH

## 2020-06-18 ENCOUNTER — NURSE TRIAGE (OUTPATIENT)
Dept: FAMILY MEDICINE | Facility: CLINIC | Age: 65
End: 2020-06-18
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
## 2020-12-09 ENCOUNTER — ANCILLARY PROCEDURE (OUTPATIENT)
Dept: GENERAL RADIOLOGY | Facility: CLINIC | Age: 65
End: 2020-12-09
Attending: PHYSICIAN ASSISTANT
Payer: COMMERCIAL

## 2020-12-09 ENCOUNTER — OFFICE VISIT (OUTPATIENT)
Dept: ORTHOPEDICS | Facility: CLINIC | Age: 65
End: 2020-12-09
Payer: COMMERCIAL

## 2020-12-09 VITALS
BODY MASS INDEX: 26.6 KG/M2 | DIASTOLIC BLOOD PRESSURE: 74 MMHG | WEIGHT: 190 LBS | SYSTOLIC BLOOD PRESSURE: 115 MMHG | HEIGHT: 71 IN

## 2020-12-09 DIAGNOSIS — M25.561 ACUTE PAIN OF RIGHT KNEE: Primary | ICD-10-CM

## 2020-12-09 DIAGNOSIS — M25.561 ACUTE PAIN OF RIGHT KNEE: ICD-10-CM

## 2020-12-09 PROCEDURE — 73562 X-RAY EXAM OF KNEE 3: CPT | Mod: RT | Performed by: RADIOLOGY

## 2020-12-09 PROCEDURE — 99214 OFFICE O/P EST MOD 30 MIN: CPT | Performed by: ORTHOPAEDIC SURGERY

## 2020-12-09 ASSESSMENT — PAIN SCALES - GENERAL: PAINLEVEL: MILD PAIN (3)

## 2020-12-09 ASSESSMENT — MIFFLIN-ST. JEOR: SCORE: 1668.96

## 2020-12-09 NOTE — LETTER
"    12/9/2020         RE: Magdaleno Springer  [address]  [place] [address]        Dear Colleague,    Thank you for referring your patient, Magdaleno Springer, to the [facility]. Please see a copy of my visit note below.    CHIEF COMPLAINT:   Chief Complaint   Patient presents with     Right Knee - Pain     Right medial knee pain. Onset: 12/4/20. Was in the attic running some wire and leg got caught and twisted, when he pulled his leg out he felt something in the inner knee and it has been bothering him since. It feels like the injury he had on the L knee         HISTORY OF PRESENT ILLNESS    Magdaleno Springer is a 65 year old male seen for evaluation of ongoing right knee pain with no known injury.   Pain has been present for 5 days. Onset 12/4/2020, while up in his rafters doing some work, was backing out of a tight space and caught his foot on a rafter, twisted the right knee, and heard a \"snap\". Didn't really bother then that day, but by the next day with increased pain. Onset of pain. Locates pain along the inner aspect of the knee. Pain with walking, worse with straightening out the leg. Pain with first getting up, sit to stand, twisting the leg. Didn't really notice any swelling or bruising. Occasional catching, then \"unsnap\" with straightening it out.    Pain at rest, night.    Treatment has been ice, advil.    Previous left knee arthroscopy, medial meniscus debridement for displaced medial meniscus tear 8/27/2019 and has done well with that. The right knee is more mild than his left knee was.      Present symptoms: pain medially , pain sharp , mild pain, severe pain, no swelling, +catching/popping, +locking, no giving way.    Pain severity: 3/10 to 10/10  Frequency of symptoms: are constant  Exacerbating Factors: weight bearing, stairs, [id number]  Relieving Factors: rest  Night Pain: Yes  Pain while at rest: Yes   Numbness or tingling: No   Patient has tried:     NSAIDS: yes, " "occasional advil      Physical Therapy: No      Activity modification: Yes      Bracing: No      Injections: No      Ice: Yes      Assistive device:  No      Orthopaedic PMH: left knee arthroscopy, medial meniscus debridement 8/2019.    Other PMH:  has a past medical history of Hyperlipidemia and Restless leg syndrome. He also has no past medical history of Basal cell carcinoma or Squamous cell carcinoma.  Patient Active Problem List   Diagnosis     Restless leg syndrome       Surgical Hx:  has a past surgical history that includes Retinal reattachment.    Medications:   Current Outpatient Medications:      HYDROcodone-acetaminophen (NORCO) 5-325 MG tablet, Take 1 tablet by mouth 3 times daily as needed for pain, Disp: 15 tablet, Rfl: 0     pramipexole (MIRAPEX) 1.5 MG tablet, , Disp: , Rfl: 3    Current Facility-Administered Medications:      ofloxacin (OCUFLOX) 0.3 % ophthalmic solution 1 drop, 1 drop, Right Eye, 4x Daily, Larry Latham, OD    Allergies: No Known Allergies    Social Hx: retired.   reports that he quit smoking about 42 years ago. He has never used smokeless tobacco.    Family Hx: family history is not on file.    REVIEW OF SYSTEMS: 10 point ROS neg other than the symptoms noted above in the HPI and PMH. Notables include  CONSTITUTIONAL:NEGATIVE for fever, chills, change in weight  INTEGUMENTARY/SKIN: NEGATIVE for worrisome rashes, moles or lesions  MUSCULOSKELETAL:See HPI above  NEURO: NEGATIVE for weakness, dizziness or paresthesias    PHYSICAL EXAM:  /74   Ht 1.803 m (5' 11\")   Wt 86.2 kg (190 lb)   BMI 26.50 kg/m     GENERAL APPEARANCE: healthy, alert, no distress  SKIN: no suspicious lesions or rashes  NEURO: Normal strength and tone, mentation intact and speech normal  PSYCH:  mentation appears normal and affect normal, not anxious  RESPIRATORY: No increased work of breathing.  HANDS: no clubbing, nail pitting  LYMPH: no palpable popliteal lymphadenopathy.    BILATERAL LOWER " EXTREMITIES:  Gait: antalgic favoring right   Alignment: varus  No gross deformities or masses.  Bilateral Quad atrophy is mild, strength normal.  Intact sensation deep peroneal nerve, superficial peroneal nerve, med/lat tibial nerve, sural nerve, saphenous nerve  Intact EHL, EDL, TA, FHL, GS, quadriceps hamstrings and hip flexors  Toes warm and well perfused, brisk capillary refill. Palpable 2+ dp pulses.  Bilateral calf soft and nttp or squeeze.  DTRs: achilles 2+, patella 2+.  Edema: trace  Prominent bilateral tibial tubercles, callus formation.    LEFT KNEE EXAM:    Skin: intact, no ecchymosis or erythema; arthroscopy surgical scars noted.  Squat: 100 %, not limited by pain.     ROM: full extension to 140 flexion  Tight hamstrings on straight leg raise.  Effusion: none  Tender: NTTP med/lat joint line, anterior or posterior knee  McMurrays: negative    MCL: stable, and non-painful at both 0 and 30 degrees knee flexion  Varus stress: stable, and non-painful at both 0 and 30 degrees knee flexion  Lachmans: neg, firm endpoint  Posterior Drawer stable  Patellofemoral joint:                Apprehension: negative              Crepitations: minimal    RIGHT KNEE EXAM:    Skin: intact, no ecchymosis or erythema  Squat: 100 %, not limited by pain.     ROM: full extension to 140 flexion, medial discomfort with ends of range of motion.  Tight hamstrings on straight leg raise.  Effusion: trace  Tender: medial joint line  nontender to palpation lat joint line, anterior or posterior knee  McMurrays: positive, medially.    MCL: stable, and non-painful at both 0 and 30 degrees knee flexion  Varus stress: stable, and non-painful at both 0 and 30 degrees knee flexion  Lachmans: neg, firm endpoint  Posterior Drawer stable  Patellofemoral joint:                Apprehension: negative              Crepitations: minimal    X-RAY:  3 views right knee from 12/9/2020 were reviewed in clinic today. On my review, no obvious fractures or  dislocations. Fairly preserved joint spaces. Slight patella subluxation.         ASSESSMENT/PLAN: Magdaleno Springer is a 65 year old male with acute right knee pain, suspect medial meniscus tear.     * exam, imaging findings discussed  * xrays show the joint appears to be in good condition, no significant degenerative changes noted at least on xrays  * concern of medial meniscus tear given symptoms of catching, sharp pain, etc.  * will refer for an MRI, call with results  * discussed treatment options, whether there is medial meniscus or not, as previous. Should there be a medial meniscus tear, again possibility of knee arthroscopy surgery as he had on the other knee  * in the meantime, rest ice heat activity modification, over the counter pain control as needed.      Robbin Alegria M.D., M.S.  Dept. of Orthopaedic Surgery  [facility]          Again, thank you for allowing me to participate in the care of your patient.        Sincerely,        Robbin Alegria MD

## 2020-12-09 NOTE — PROGRESS NOTES
"CHIEF COMPLAINT:   Chief Complaint   Patient presents with     Right Knee - Pain     Right medial knee pain. Onset: 12/4/20. Was in the attic running some wire and leg got caught and twisted, when he pulled his leg out he felt something in the inner knee and it has been bothering him since. It feels like the injury he had on the L knee         HISTORY OF PRESENT ILLNESS    Magdaleno Springer is a 65 year old male seen for evaluation of ongoing right knee pain with no known injury.   Pain has been present for 5 days. Onset 12/4/2020, while up in his rafters doing some work, was backing out of a tight space and caught his foot on a rafter, twisted the right knee, and heard a \"snap\". Didn't really bother then that day, but by the next day with increased pain. Onset of pain. Locates pain along the inner aspect of the knee. Pain with walking, worse with straightening out the leg. Pain with first getting up, sit to stand, twisting the leg. Didn't really notice any swelling or bruising. Occasional catching, then \"unsnap\" with straightening it out.    Pain at rest, night.    Treatment has been ice, advil.    Previous left knee arthroscopy, medial meniscus debridement for displaced medial meniscus tear 8/27/2019 and has done well with that. The right knee is more mild than his left knee was.      Present symptoms: pain medially , pain sharp , mild pain, severe pain, no swelling, +catching/popping, +locking, no giving way.    Pain severity: 3/10 to 10/10  Frequency of symptoms: are constant  Exacerbating Factors: weight bearing, stairs, [id number]  Relieving Factors: rest  Night Pain: Yes  Pain while at rest: Yes   Numbness or tingling: No   Patient has tried:     NSAIDS: yes, occasional advil      Physical Therapy: No      Activity modification: Yes      Bracing: No      Injections: No      Ice: Yes      Assistive device:  No      Orthopaedic PMH: left knee arthroscopy, medial meniscus debridement 8/2019.    Other PMH:  has a " "past medical history of Hyperlipidemia and Restless leg syndrome. He also has no past medical history of Basal cell carcinoma or Squamous cell carcinoma.  Patient Active Problem List   Diagnosis     Restless leg syndrome       Surgical Hx:  has a past surgical history that includes Retinal reattachment.    Medications:   Current Outpatient Medications:      HYDROcodone-acetaminophen (NORCO) 5-325 MG tablet, Take 1 tablet by mouth 3 times daily as needed for pain, Disp: 15 tablet, Rfl: 0     pramipexole (MIRAPEX) 1.5 MG tablet, , Disp: , Rfl: 3    Current Facility-Administered Medications:      ofloxacin (OCUFLOX) 0.3 % ophthalmic solution 1 drop, 1 drop, Right Eye, 4x Daily, Larry Latham, [place]    Allergies: No Known Allergies    Social Hx: retired.   reports that he quit smoking about 42 years ago. He has never used smokeless tobacco.    Family Hx: family history is not on file.    REVIEW OF SYSTEMS: 10 point ROS neg other than the symptoms noted above in the HPI and PMH. Notables include  CONSTITUTIONAL:NEGATIVE for fever, chills, change in weight  INTEGUMENTARY/SKIN: NEGATIVE for worrisome rashes, moles or lesions  MUSCULOSKELETAL:See HPI above  NEURO: NEGATIVE for weakness, dizziness or paresthesias    PHYSICAL EXAM:  /74   Ht 1.803 m (5' 11\")   Wt 86.2 kg (190 lb)   BMI 26.50 kg/m     GENERAL APPEARANCE: healthy, alert, no distress  SKIN: no suspicious lesions or rashes  NEURO: Normal strength and tone, mentation intact and speech normal  PSYCH:  mentation appears normal and affect normal, not anxious  RESPIRATORY: No increased work of breathing.  HANDS: no clubbing, nail pitting  LYMPH: no palpable popliteal lymphadenopathy.    BILATERAL LOWER EXTREMITIES:  Gait: antalgic favoring right   Alignment: varus  No gross deformities or masses.  Bilateral Quad atrophy is mild, strength normal.  Intact sensation deep peroneal nerve, superficial peroneal nerve, med/lat tibial nerve, sural nerve, saphenous " nerve  Intact EHL, EDL, TA, FHL, GS, quadriceps hamstrings and hip flexors  Toes warm and well perfused, brisk capillary refill. Palpable 2+ dp pulses.  Bilateral calf soft and nttp or squeeze.  DTRs: achilles 2+, patella 2+.  Edema: trace  Prominent bilateral tibial tubercles, callus formation.    LEFT KNEE EXAM:    Skin: intact, no ecchymosis or erythema; arthroscopy surgical scars noted.  Squat: 100 %, not limited by pain.     ROM: full extension to 140 flexion  Tight hamstrings on straight leg raise.  Effusion: none  Tender: NTTP med/lat joint line, anterior or posterior knee  McMurrays: negative    MCL: stable, and non-painful at both 0 and 30 degrees knee flexion  Varus stress: stable, and non-painful at both 0 and 30 degrees knee flexion  Lachmans: neg, firm endpoint  Posterior Drawer stable  Patellofemoral joint:                Apprehension: negative              Crepitations: minimal    RIGHT KNEE EXAM:    Skin: intact, no ecchymosis or erythema  Squat: 100 %, not limited by pain.     ROM: full extension to 140 flexion, medial discomfort with ends of range of motion.  Tight hamstrings on straight leg raise.  Effusion: trace  Tender: medial joint line  nontender to palpation lat joint line, anterior or posterior knee  McMurrays: positive, medially.    MCL: stable, and non-painful at both 0 and 30 degrees knee flexion  Varus stress: stable, and non-painful at both 0 and 30 degrees knee flexion  Lachmans: neg, firm endpoint  Posterior Drawer stable  Patellofemoral joint:                Apprehension: negative              Crepitations: minimal    X-RAY:  3 views right knee from 12/9/2020 were reviewed in clinic today. On my review, no obvious fractures or dislocations. Fairly preserved joint spaces. Slight patella subluxation.         ASSESSMENT/PLAN: Magdaleno Springer is a 65 year old male with acute right knee pain, suspect medial meniscus tear.     * exam, imaging findings discussed  * xrays show the joint  appears to be in good condition, no significant degenerative changes noted at least on xrays  * concern of medial meniscus tear given symptoms of catching, sharp pain, etc.  * will refer for an MRI, call with results  * discussed treatment options, whether there is medial meniscus or not, as previous. Should there be a medial meniscus tear, again possibility of knee arthroscopy surgery as he had on the other knee  * in the meantime, rest ice heat activity modification, over the counter pain control as needed.      Robbin Alegria M.D., M.S.  Dept. of Orthopaedic Surgery  [facility]

## 2020-12-15 ENCOUNTER — ANCILLARY PROCEDURE (OUTPATIENT)
Dept: MRI IMAGING | Facility: CLINIC | Age: 65
End: 2020-12-15
Attending: PHYSICIAN ASSISTANT
Payer: COMMERCIAL

## 2020-12-15 DIAGNOSIS — M25.561 ACUTE PAIN OF RIGHT KNEE: ICD-10-CM

## 2020-12-15 PROCEDURE — 73721 MRI JNT OF LWR EXTRE W/O DYE: CPT | Mod: RT | Performed by: RADIOLOGY

## 2020-12-16 ENCOUNTER — TELEPHONE (OUTPATIENT)
Dept: ORTHOPEDICS | Facility: CLINIC | Age: 65
End: 2020-12-16

## 2020-12-16 DIAGNOSIS — S83.231A COMPLEX TEAR OF MEDIAL MENISCUS OF RIGHT KNEE, UNSPECIFIED WHETHER OLD OR CURRENT TEAR, INITIAL ENCOUNTER: Primary | ICD-10-CM

## 2020-12-16 NOTE — TELEPHONE ENCOUNTER
Type of surgery: right knee arthroscopy,meniscal and chondral debridement  CPT 47857   Acute pain of right knee M25.561    Location of surgery: [facility]  Date and time of surgery: 12-22-20  12:30pm  Surgeon: Dr Alegria  Pre-Op Appt Date: 12-18-20  Post-Op Appt Date: 1-6-21   Packet sent out: No  Pre-cert/Authorization completed:    Per [employer] prior auth is needed. I have submitted, waiting for response.   Reference Number  [id number]  Date: 12/16/2020    Thank you,   Katlyn Garcia   Prior Authorization [facility]  [phone]

## 2020-12-17 NOTE — TELEPHONE ENCOUNTER
Call from Swathi at [facility], advised she will be faxing me form to fill out and send back to her with clinicals.     I have faxed with clinicals. Marked as urgent

## 2020-12-17 NOTE — PROGRESS NOTES
[facility] [place]  [address]  [place] [address]  Phone: [phone]  Primary Provider: Nadia Davenport  Pre-op Performing Provider: KENNY HINES    PREOPERATIVE EVALUATION:  Today's date: 12/18/2020    Magdaleno Springer is a 65 year old male who presents for a preoperative evaluation.    Surgical Information:  Surgery/Procedure: Torn meniscus  Surgery Location: [facility]  Surgeon: Percy   Surgery Date: 12/22/20  Time of Surgery: 130pm   Where patient plans to recover: At home with family  Fax number for surgical facility: [phone]    Type of Anesthesia Anticipated: General    Subjective     HPI related to upcoming procedure: right knee has been painful and locking for 2+ weeks.    Preop Questions 12/17/2020   1. Have you ever had a heart attack or stroke? No   2. Have you ever had surgery on your heart or blood vessels, such as a stent placement, a coronary artery bypass, or surgery on an artery in your head, neck, heart, or legs? No   3. Do you have chest pain with activity? No   4. Do you have a history of  heart failure? No   5. Do you currently have a cold, bronchitis or symptoms of other infection? No   6. Do you have a cough, shortness of breath, or wheezing? No   7. Do you or anyone in your family have previous history of blood clots? No   8. Do you or does anyone in your family have a serious bleeding problem such as prolonged bleeding following surgeries or cuts? No   9. Have you ever had problems with anemia or been told to take iron pills? No   10. Have you had any abnormal blood loss such as black, tarry or bloody stools? No   11. Have you ever had a blood transfusion? No   12. Are you willing to have a blood transfusion if it is medically needed before, during, or after your surgery? Yes   13. Have you or any of your relatives ever had problems with anesthesia? No   14. Do you have sleep apnea, excessive snoring or daytime drowsiness? No    15. Do you have any artifical heart valves or other implanted medical devices like a pacemaker, defibrillator, or continuous glucose monitor? No   16. Do you have artificial joints? No   17. Are you allergic to latex? No       Health Care Directive:  Patient does not have a Health Care Directive or Living Will: Discussed advance care planning with patient; however, patient declined at this time.    Preoperative Review of :   reviewed - controlled substances reflected in medication list.      Status of Chronic Conditions:  See problem list for active medical problems.  Problems all longstanding and stable, except as noted/documented.  See ROS for pertinent symptoms related to these conditions.      Review of Systems  CONSTITUTIONAL: NEGATIVE for fever, chills, change in weight  INTEGUMENTARY/SKIN: NEGATIVE for worrisome rashes, moles or lesions  EYES: NEGATIVE for vision changes or irritation  ENT/MOUTH: NEGATIVE for ear, mouth and throat problems  RESP: NEGATIVE for significant cough or SOB  BREAST: NEGATIVE for masses, tenderness or discharge  CV: NEGATIVE for chest pain, palpitations or peripheral edema  GI: NEGATIVE for nausea, abdominal pain, heartburn, or change in bowel habits  : NEGATIVE for frequency, dysuria, or hematuria  MUSCULOSKELETAL: NEGATIVE for significant arthralgias or myalgia  NEURO: NEGATIVE for weakness, dizziness or paresthesias  ENDOCRINE: NEGATIVE for temperature intolerance, skin/hair changes  HEME: NEGATIVE for bleeding problems  PSYCHIATRIC: NEGATIVE for changes in mood or affect    Patient Active Problem List    Diagnosis Date Noted     Restless leg syndrome 08/21/2019     Priority: Medium      Past Medical History:   Diagnosis Date     Hyperlipidemia      Restless leg syndrome      Past Surgical History:   Procedure Laterality Date     RETINAL REATTACHMENT       Current Outpatient Medications   Medication Sig Dispense Refill     pramipexole (MIRAPEX) 1.5 MG tablet   3      HYDROcodone-acetaminophen (NORCO) 5-325 MG tablet Take 1 tablet by mouth 3 times daily as needed for pain (Patient not taking: Reported on 2020) 15 tablet 0       No Known Allergies     Social History     Tobacco Use     Smoking status: Former Smoker     Quit date:      Years since quittin.9     Smokeless tobacco: Never Used   Substance Use Topics     Alcohol use: Yes     Comment: 1 beer every 6 month     Family History   Problem Relation Age of Onset     No Known Problems Mother      No Known Problems Father      No Known Problems Brother      No Known Problems Sister      Glaucoma No family hx of      Macular Degeneration No family hx of      Melanoma No family hx of      Skin Cancer No family hx of      History   Drug Use Unknown         Objective     /72   Pulse 51   Temp 97  F (36.1  C) (Tympanic)   Resp 16   Wt 89.8 kg (198 lb)   SpO2 100%   BMI 27.62 kg/m      Physical Exam    GENERAL APPEARANCE: healthy, alert and no distress     EYES: EOMI,  PERRL     HENT: nose and mouth without ulcers or lesions     NECK: no adenopathy, no asymmetry, masses, or scars and thyroid normal to palpation     RESP: lungs clear to auscultation - no rales, rhonchi or wheezes     CV: regular rates and rhythm, normal S1 S2, no S3 or S4 and no murmur, click or rub     ABDOMEN:  soft, nontender, no HSM or masses and bowel sounds normal     MS: extremities normal- no gross deformities noted, no evidence of inflammation in joints, FROM in all extremities.     SKIN: no suspicious lesions or rashes     NEURO: Normal strength and tone, sensory exam grossly normal, mentation intact and speech normal     PSYCH: mentation appears normal. and affect normal/bright     LYMPHATICS: No cervical adenopathy    Diagnostics:  Results for orders placed or performed in visit on 20   CBC with platelets     Status: None   Result Value Ref Range    WBC 5.5 4.0 - 11.0 10e9/L    RBC Count 4.77 4.4 - 5.9 10e12/L    Hemoglobin  14.9 13.3 - 17.7 g/dL    Hematocrit 43.9 40.0 - 53.0 %    MCV 92 78 - 100 fl    MCH 31.2 26.5 - 33.0 pg    MCHC 33.9 31.5 - 36.5 g/dL    RDW 13.1 10.0 - 15.0 %    Platelet Count 191 150 - 450 10e9/L   Basic metabolic panel  (Ca, Cl, CO2, Creat, Gluc, K, Na, BUN)     Status: Abnormal   Result Value Ref Range    Sodium 137 133 - 144 mmol/L    Potassium 4.1 3.4 - 5.3 mmol/L    Chloride 103 94 - 109 mmol/L    Carbon Dioxide 33 (H) 20 - 32 mmol/L    Anion Gap 1 (L) 3 - 14 mmol/L    Glucose 85 70 - 99 mg/dL    Urea Nitrogen 12 7 - 30 mg/dL    Creatinine 0.80 0.66 - 1.25 mg/dL    GFR Estimate >90 >60 mL/min/[1.73_m2]    GFR Estimate If Black >90 >60 mL/min/[1.73_m2]    Calcium 9.0 8.5 - 10.1 mg/dL     EKG: sinus bradycardia with HR of 46bpm without other significant changes compared to EKG of 8/21/19    Revised Cardiac Risk Index (RCRI):  The patient has the following serious cardiovascular risks for perioperative complications:   - No serious cardiac risks = 0 points     RCRI Interpretation: 0 points: Class I (very low risk - 0.4% complication rate)     Assessment & Plan   The proposed surgical procedure is considered INTERMEDIATE risk.    Problem List Items Addressed This Visit     None      Visit Diagnoses     Preop general physical exam    -  Primary    Relevant Orders    CBC with platelets (Completed)    Basic metabolic panel  (Ca, Cl, CO2, Creat, Gluc, K, Na, BUN) (Completed)    EKG 12-lead complete w/read - Clinics (Completed)        Patient is bradycardic, however he reached a HR of 60bpm with ambulation in the ER. Likely has bradycardic resting heart rate.    Risks and Recommendations:  The patient has the following additional risks and recommendations for perioperative complications:   - No identified additional risk factors other than previously addressed    Medication Instructions:   - aspirin: Discontinue aspirin 7-10 days prior to procedure to reduce bleeding risk. It should be resumed postoperatively.    -  ibuprofen (Advil, Motrin): HOLD 1 day before surgery.     RECOMMENDATION:  APPROVAL GIVEN to proceed with proposed procedure.    Signed Electronically by: AILYN Christianson    Copy of this evaluation report is provided to requesting physician.    Preop [facility] Preop Guidelines    Revised Cardiac Risk Index

## 2020-12-18 ENCOUNTER — OFFICE VISIT (OUTPATIENT)
Dept: FAMILY MEDICINE | Facility: CLINIC | Age: 65
End: 2020-12-18
Payer: COMMERCIAL

## 2020-12-18 VITALS
SYSTOLIC BLOOD PRESSURE: 106 MMHG | RESPIRATION RATE: 16 BRPM | WEIGHT: 198 LBS | HEART RATE: 51 BPM | OXYGEN SATURATION: 100 % | BODY MASS INDEX: 27.62 KG/M2 | TEMPERATURE: 97 F | DIASTOLIC BLOOD PRESSURE: 72 MMHG

## 2020-12-18 DIAGNOSIS — Z01.818 PREOP GENERAL PHYSICAL EXAM: Primary | ICD-10-CM

## 2020-12-18 DIAGNOSIS — S83.231A COMPLEX TEAR OF MEDIAL MENISCUS OF RIGHT KNEE, UNSPECIFIED WHETHER OLD OR CURRENT TEAR, INITIAL ENCOUNTER: ICD-10-CM

## 2020-12-18 LAB
ANION GAP SERPL CALCULATED.3IONS-SCNC: 1 MMOL/L (ref 3–14)
BUN SERPL-MCNC: 12 MG/DL (ref 7–30)
CALCIUM SERPL-MCNC: 9 MG/DL (ref 8.5–10.1)
CHLORIDE SERPL-SCNC: 103 MMOL/L (ref 94–109)
CO2 SERPL-SCNC: 33 MMOL/L (ref 20–32)
CREAT SERPL-MCNC: 0.8 MG/DL (ref 0.66–1.25)
ERYTHROCYTE [DISTWIDTH] IN BLOOD BY AUTOMATED COUNT: 13.1 % (ref 10–15)
GFR SERPL CREATININE-BSD FRML MDRD: >90 ML/MIN/{1.73_M2}
GLUCOSE SERPL-MCNC: 85 MG/DL (ref 70–99)
HCT VFR BLD AUTO: 43.9 % (ref 40–53)
HGB BLD-MCNC: 14.9 G/DL (ref 13.3–17.7)
MCH RBC QN AUTO: 31.2 PG (ref 26.5–33)
MCHC RBC AUTO-ENTMCNC: 33.9 G/DL (ref 31.5–36.5)
MCV RBC AUTO: 92 FL (ref 78–100)
PLATELET # BLD AUTO: 191 10E9/L (ref 150–450)
POTASSIUM SERPL-SCNC: 4.1 MMOL/L (ref 3.4–5.3)
RBC # BLD AUTO: 4.77 10E12/L (ref 4.4–5.9)
SARS-COV-2 RNA SPEC QL NAA+PROBE: NOT DETECTED
SODIUM SERPL-SCNC: 137 MMOL/L (ref 133–144)
SPECIMEN SOURCE: NORMAL
WBC # BLD AUTO: 5.5 10E9/L (ref 4–11)

## 2020-12-18 PROCEDURE — 93000 ELECTROCARDIOGRAM COMPLETE: CPT | Performed by: PHYSICIAN ASSISTANT

## 2020-12-18 PROCEDURE — 99214 OFFICE O/P EST MOD 30 MIN: CPT | Performed by: PHYSICIAN ASSISTANT

## 2020-12-18 PROCEDURE — 80048 BASIC METABOLIC PNL TOTAL CA: CPT | Performed by: PHYSICIAN ASSISTANT

## 2020-12-18 PROCEDURE — U0003 INFECTIOUS AGENT DETECTION BY NUCLEIC ACID (DNA OR RNA); SEVERE ACUTE RESPIRATORY SYNDROME CORONAVIRUS 2 (SARS-COV-2) (CORONAVIRUS DISEASE [COVID-19]), AMPLIFIED PROBE TECHNIQUE, MAKING USE OF HIGH THROUGHPUT TECHNOLOGIES AS DESCRIBED BY CMS-2020-01-R: HCPCS | Performed by: ORTHOPAEDIC SURGERY

## 2020-12-18 PROCEDURE — 36415 COLL VENOUS BLD VENIPUNCTURE: CPT | Performed by: PHYSICIAN ASSISTANT

## 2020-12-18 PROCEDURE — 85027 COMPLETE CBC AUTOMATED: CPT | Performed by: PHYSICIAN ASSISTANT

## 2020-12-18 NOTE — PATIENT INSTRUCTIONS
"Uli Dolan,    Thank you for allowing [facility] to manage your care.    I ordered some blood work, please go to the laboratory to get your laboratory studies.    Please allow 1-2 business days for our office to contact you in regards to your laboratory/radiological studies.  If not done so, I encourage you to login into [employer] ([email or web address] to review your results as well.     If you have any questions or concerns, please feel free to call us at [phone]    Sincerely,    Gilbert Carrion PA-C    Did you know?      You can schedule a video visit for follow-up appointments as well as future appointments for certain conditions.  Please see the below link.     [email or web address]    If you have not already done so,  I encourage you to sign up for [employer] ([email or web address]).  This will allow you to review your results, securely communicate with a provider, and schedule virtual visits as well.      Preparing for Your Surgery  Getting started  A surgery nurse will call you to review your health history and instructions. They will give you an arrival time based on your scheduled surgery time.  Please be ready to share the following:    Your doctor's clinic name and phone number    Your medical, surgical and anesthesia history    A list of allergies and sensitivities    A list of medicines, including herbal treatments and over-the-counter drugs    Whether the patient has a legal guardian (ask how to send us the papers in advance)  If your child is having surgery, please ask for a copy of Preparing for Your Child's Surgery.    Preparing for surgery    Within 30 days of surgery: Have an exam at your family clinic (primary care clinic), or go to a pre-operative clinic. This exam is called a \"History and Physical,\" or H&P.    At your H&P exam, talk to your care team about all medicines you take. If you need to stop any medicines before " surgery, ask when to start taking them again.  ? We do this for your safety. Many medicines can make you bleed too much during surgery. Some change how well surgery (anesthesia) drugs work.    Call your insurance company to see what it will and won't pay for. Ask if they need to pre-approve the surgery. (If no insurance, call [phone].)    Call your surgeon's clinic if there's any change in your health. This includes signs of a cold or flu (sore throat, runny nose, cough, rash, fever). It also includes a scrape or scratch near the surgery site.    If you have questions on the day of surgery, call your surgery center.  Eating and drinking guidelines  For your safety: Unless your surgeon tells you otherwise, follow the guidelines below.    Eat and drink as usual until 8 hours before surgery. After that, no food or milk.    Drink clear liquids until 2 hours before surgery. These are liquids you can see through, like water, Gatorade and Propel Water. You may also have black coffee and tea (no cream or milk).    Nothing by mouth within 2 hours of surgery. This includes gum, candy and breath mints.    Stop alcohol the midnight before surgery.    If your family clinic tells you to take medicine on the morning of surgery, it's okay to take it with a sip of water.  Preventing infection    Shower or bathe the night before and morning of your surgery. Follow the instructions your clinic gave you. (If no instructions, use regular soap.)    Don't shave or clip hair near your surgery site. This can lead to skin infection.    Don't smoke the morning of surgery. Smoking increases the risk of infection. You may chew nicotine gum up to 2 hours before surgery. A nicotine patch is okay.  ? Note: Some surgeries require you to completely quit smoking and nicotine. Check with your surgeon.    Your care team will make every effort to keep you safe from infection. We will:  ? Clean our hands often with soap and water (or an  alcohol-based hand rub).  ? Clean the skin at your surgery site with a special soap that kills germs. We'll also remove hair from the site as needed.  ? Wear special hair covers, masks, gowns and gloves during surgery.  ? Give antibiotic medicine, if prescribed. Not all surgeries need antibiotics.  What to bring on the day of surgery    Photo ID and insurance card    Copy of your health care directive, if you have one    Glasses and hearing aides (bring cases)  ? You can't wear contacts during surgery    Inhaler and eye drops, if you use them (tell us about these when you arrive)    CPAP machine or breathing device, if you use them    A few personal items, if spending the night    If you have . . .  ? A pacemaker or ICD (cardiac defibrillator): Bring the ID card.  ? An implanted stimulator: Bring the remote control.  ? A legal guardian: Bring a copy of the certified (court-stamped) guardianship papers.  Please remove any jewelry, including body piercings. Leave jewelry and other valuables at home.  If you're going home the day of surgery  Important: If you don't follow the rules below, we must cancel your surgery.     Arrange for someone to drive you home after surgery. You may not drive, take a taxi or take public transportation by yourself (unless you'll have local anesthesia only).    Arrange for a responsible adult to stay with you overnight. If you don't, we may keep you in the hospital overnight, and you may need to pay the costs yourself.  Questions?   If you have any questions for your care team, list them here: _________________________________________________________________________________________________________________________________________________________________________________________________________________________________________________________________________________________________________________________  For informational purposes only. Not to replace the advice of your health care provider.  Copyright   4281-2485 [facility]. All rights reserved. Clinically reviewed by Thi Thomas MD. saperatec [id number] - REV 07/19.

## 2020-12-18 NOTE — TELEPHONE ENCOUNTER
Call from Iram at [facility] asking for MRI results. I faxed to her at [phone]   yes

## 2020-12-21 NOTE — TELEPHONE ENCOUNTER
Call to [facility] [phone]    I spoke to Nataliia, she did not give me Iram's phone number only transferred me to her voicemail. I left urgent message to call me ASAP, because surgery is scheduled form tomorrow.

## 2020-12-21 NOTE — TELEPHONE ENCOUNTER
Katlyn at [facility] is calling to check on approval for surgery for 12/22/2020 Please call her to advise thank you     Katlyn Pennington  Procedural Services Specialist  Direct: [phone]    Fax: [phone]  [email or web address]

## 2020-12-21 NOTE — TELEPHONE ENCOUNTER
I set up a call with Dr. Robbin Alegria and Dr. Jenkins (male) at 11:30am CST. Dr. Jenkins will be calling [phone]    Lui Harris PA-C, BELINDA (Ortho)  Supervising Physician: Robbin Alegria M.D., M.S.  Dept. of Orthopaedic Surgery  [facility]

## 2020-12-21 NOTE — TELEPHONE ENCOUNTER
Call from Les at [facility], surgery has been approved auth # [id number]. I have called Katlyn at [facility] with approval information.   I will fax paperwork to [facility]

## 2020-12-21 NOTE — TELEPHONE ENCOUNTER
Call to [employer], I was told by rep that new paperwork needs to be submitted and then I can check back in 3 hours. I will work on now.     I have faxed new [facility] form with clinicals and MRI results.

## 2020-12-21 NOTE — TELEPHONE ENCOUNTER
Per fax from [employer]:    Based on the initial review, code 11760 does not meet medical necessity criteria.   They are offering that the provider may speak to an [employer] provider to review this request.   Please call [phone]    With reference # [id number]  Patient information:  Magdaleno Springer  ID # [id number]   1955  [id number]    This call will need to be done before  at 9:45am EST    Additional information may be sent by fax to [phone]    If any other questions you can call [phone]

## 2020-12-22 ENCOUNTER — TRANSFERRED RECORDS (OUTPATIENT)
Dept: HEALTH INFORMATION MANAGEMENT | Facility: CLINIC | Age: 65
End: 2020-12-22

## 2021-01-03 ENCOUNTER — HEALTH MAINTENANCE LETTER (OUTPATIENT)
Age: 66
End: 2021-01-03

## 2021-01-06 ENCOUNTER — OFFICE VISIT (OUTPATIENT)
Dept: ORTHOPEDICS | Facility: CLINIC | Age: 66
End: 2021-01-06
Payer: COMMERCIAL

## 2021-01-06 VITALS
BODY MASS INDEX: 25.84 KG/M2 | HEIGHT: 73 IN | DIASTOLIC BLOOD PRESSURE: 61 MMHG | WEIGHT: 195 LBS | SYSTOLIC BLOOD PRESSURE: 117 MMHG

## 2021-01-06 DIAGNOSIS — Z98.890 S/P ARTHROSCOPY OF RIGHT KNEE: Primary | ICD-10-CM

## 2021-01-06 PROCEDURE — 99024 POSTOP FOLLOW-UP VISIT: CPT | Performed by: ORTHOPAEDIC SURGERY

## 2021-01-06 ASSESSMENT — PAIN SCALES - GENERAL: PAINLEVEL: NO PAIN (0)

## 2021-01-06 ASSESSMENT — MIFFLIN-ST. JEOR: SCORE: 1726.56
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
## 2021-08-12 ENCOUNTER — OFFICE VISIT (OUTPATIENT)
Dept: URGENT CARE | Facility: URGENT CARE | Age: 66
End: 2021-08-12
Payer: COMMERCIAL

## 2021-08-12 VITALS
TEMPERATURE: 97.9 F | HEART RATE: 51 BPM | OXYGEN SATURATION: 98 % | SYSTOLIC BLOOD PRESSURE: 116 MMHG | DIASTOLIC BLOOD PRESSURE: 68 MMHG

## 2021-08-12 DIAGNOSIS — L03.032 PARONYCHIA OF TOE, LEFT: ICD-10-CM

## 2021-08-12 DIAGNOSIS — S99.922A TOE INJURY, LEFT, INITIAL ENCOUNTER: Primary | ICD-10-CM

## 2021-08-12 PROCEDURE — 99213 OFFICE O/P EST LOW 20 MIN: CPT | Performed by: NURSE PRACTITIONER

## 2021-08-12 RX ORDER — CEPHALEXIN 500 MG/1
500 CAPSULE ORAL 3 TIMES DAILY
Qty: 21 CAPSULE | Refills: 0 | Status: SHIPPED | OUTPATIENT
Start: 2021-08-12 | End: 2021-08-18

## 2021-08-12 ASSESSMENT — ENCOUNTER SYMPTOMS
DIAPHORESIS: 0
FEVER: 0
RHINORRHEA: 0
CHILLS: 0
VOMITING: 0
SHORTNESS OF BREATH: 0
SORE THROAT: 0
NAUSEA: 0
COUGH: 0
DIARRHEA: 0

## 2021-08-12 NOTE — PATIENT INSTRUCTIONS
Patient Education     Paronychia of the Finger or Toe  Paronychia is an infection near a fingernail or toenail. It usually occurs when an opening in the cuticle or an ingrown toenail lets bacteria under the skin.   The infection will need to be drained if pus is present. If the infection has been caught early, you may need only antibiotic treatment. Healing will take about 1 to 2 weeks.   Home care  Follow these guidelines when caring for yourself at home:     Clean and soak the toe or finger. Do this 2 times a day for the first 3 days. To do so:  ? Soak your foot or hand in a tub of warm water for 5 minutes. Or hold your toe or finger under a faucet of warm running water for 5 minutes.  ? Clean any crust away with soap and water using a cotton swab.  ? Put antibiotic ointment on the infected area.    Change the dressing daily or any time it gets dirty.    If you were given antibiotics, take them as directed until they are all gone.    If your infection is on a toe, wear comfortable shoes with a lot of toe room. You can also wear open-toed sandals while your toe heals.    You may use over-the-counter medicine (acetaminophen or ibuprofen) to help with pain, unless another medicine was prescribed. If you have chronic liver or kidney disease, talk with your healthcare provider before using these medicines. Also talk with your provider if you've had a stomach ulcer or gastrointestinal bleeding.  Prevention  The following can prevent paronychia:     Don't cut or play with your cuticles at home. A healthy cuticle maintains a seal between your skin and nail and keeps out infection.    Don't bite your nails.    Don't suck on your thumbs or fingers.  Follow-up care  Follow up with your healthcare provider, or as advised.   When to seek medical advice  Call your healthcare provider right away if any of these occur:     Redness, pain, or swelling of the finger or toe gets worse    You have trouble moving or bending the  finger or toe    Red streaks in the skin leading away from the wound    Pus or fluid draining from the nail area    Fever of 100.4 F (38 C) or higher, or as directed by your provider  James last reviewed this educational content on 7/1/2019 2000-2021 [employer]. All rights reserved. This information is not intended as a substitute for professional medical care. Always follow your healthcare professional's instructions.

## 2021-08-12 NOTE — PROGRESS NOTES
SUBJECTIVE:   Magdaleno Springer is a 66 year old male presenting with a chief complaint of   Chief Complaint   Patient presents with     Toe Pain     Dropped a safe on left foot. Big toe lost nail, thinks it may be infected. Swollen, tender and painful.        He is an established patient of [place].    Left big toe Injury/Pain    Onset of symptoms was 1 week(s) ago.  Location: left toe(s) great  Context:       The injury happened while at home      Mechanism: dropped a heavy safe on left big toe accidentally      Patient experienced immediate pain, delayed pain, delayed swelling  Course of symptoms is worsening.    Severity moderate  Current and Associated symptoms: Pain and Swelling  Denies  Decreased range of motion  Aggravating Factors: movement  Therapies to improve symptoms include: ice and rest  This is the first time this type of problem has occurred for this patient.       Review of Systems   Constitutional: Negative for chills, diaphoresis and fever.   HENT: Negative for congestion, ear pain, rhinorrhea and sore throat.    Respiratory: Negative for cough and shortness of breath.    Gastrointestinal: Negative for diarrhea, nausea and vomiting.   Musculoskeletal:        Left toe injury and pain    All other systems reviewed and are negative.      Past Medical History:   Diagnosis Date     Hyperlipidemia      Restless leg syndrome      Family History   Problem Relation Age of Onset     No Known Problems Mother      No Known Problems Father      No Known Problems Brother      No Known Problems Sister      Glaucoma No family hx of      Macular Degeneration No family hx of      Melanoma No family hx of      Skin Cancer No family hx of      Current Outpatient Medications   Medication Sig Dispense Refill     cephALEXin (KEFLEX) 500 MG capsule Take 1 capsule (500 mg) by mouth 3 times daily for 7 days 21 capsule 0     pramipexole (MIRAPEX) 1.5 MG tablet   3     HYDROcodone-acetaminophen (NORCO) 5-325 MG tablet Take 1  tablet by mouth 3 times daily as needed for pain (Patient not taking: Reported on 2021) 15 tablet 0     Social History     Tobacco Use     Smoking status: Former Smoker     Quit date: 1978     Years since quittin.6     Smokeless tobacco: Never Used   Substance Use Topics     Alcohol use: Yes     Comment: 1 beer every 6 month       OBJECTIVE  /68   Pulse 51   Temp 97.9  F (36.6  C) (Oral)   SpO2 98%     Physical Exam  Vitals and nursing note reviewed.   Constitutional:       General: He is not in acute distress.     Appearance: He is well-developed. He is not diaphoretic.   HENT:      Head: Normocephalic and atraumatic.      Right Ear: External ear normal.      Left Ear: External ear normal.   Eyes:      Pupils: Pupils are equal, round, and reactive to light.   Cardiovascular:      Rate and Rhythm: Normal rate.      Pulses: Normal pulses.   Pulmonary:      Effort: Pulmonary effort is normal. No respiratory distress.      Breath sounds: Normal breath sounds.   Musculoskeletal:      Cervical back: Normal range of motion and neck supple.   Lymphadenopathy:      Cervical: No cervical adenopathy.   Skin:     General: Skin is warm and dry.      Comments: Tenderness and swelling of left big toe along the proximal cuticle. Feels warm to touch. ROM is intact.   Neurological:      General: No focal deficit present.      Mental Status: He is alert.      Cranial Nerves: No cranial nerve deficit.   Psychiatric:         Mood and Affect: Mood normal.         Labs:  No results found for this or any previous visit (from the past 24 hour(s)).    X-Ray was not done.Patietn declined    ASSESSMENT:      ICD-10-CM    1. Toe injury, left, initial encounter  S99.922A XR Toe Left G/E 2 Views   2. Paronychia of toe, left  L03.032 cephALEXin (KEFLEX) 500 MG capsule        PLAN:  Plan of care as above  I recommend follow up with PCP in 3 days or sooner if symptoms are getting worse  Advised to take medications as  prescribed  Side effects of medications discussed  Over the counter medications discussed  Worrisome symptoms are discussed and instructions to go to the ER.  All questions are answered and patient verbalized understanding and agrees with this plan.  Jyothi Tovar  [facility]  Family Nurse Practitoner        Patient Instructions     Patient Education     Paronychia of the Finger or Toe  Paronychia is an infection near a fingernail or toenail. It usually occurs when an opening in the cuticle or an ingrown toenail lets bacteria under the skin.   The infection will need to be drained if pus is present. If the infection has been caught early, you may need only antibiotic treatment. Healing will take about 1 to 2 weeks.   Home care  Follow these guidelines when caring for yourself at home:     Clean and soak the toe or finger. Do this 2 times a day for the first 3 days. To do so:  ? Soak your foot or hand in a tub of warm water for 5 minutes. Or hold your toe or finger under a faucet of warm running water for 5 minutes.  ? Clean any crust away with soap and water using a cotton swab.  ? Put antibiotic ointment on the infected area.    Change the dressing daily or any time it gets dirty.    If you were given antibiotics, take them as directed until they are all gone.    If your infection is on a toe, wear comfortable shoes with a lot of toe room. You can also wear open-toed sandals while your toe heals.    You may use over-the-counter medicine (acetaminophen or ibuprofen) to help with pain, unless another medicine was prescribed. If you have chronic liver or kidney disease, talk with your healthcare provider before using these medicines. Also talk with your provider if you've had a stomach ulcer or gastrointestinal bleeding.  Prevention  The following can prevent paronychia:     Don't cut or play with your cuticles at home. A healthy cuticle maintains a seal between your skin and nail and keeps out infection.    Don't bite your  nails.    Don't suck on your thumbs or fingers.  Follow-up care  Follow up with your healthcare provider, or as advised.   When to seek medical advice  Call your healthcare provider right away if any of these occur:     Redness, pain, or swelling of the finger or toe gets worse    You have trouble moving or bending the finger or toe    Red streaks in the skin leading away from the wound    Pus or fluid draining from the nail area    Fever of 100.4 F (38 C) or higher, or as directed by your provider  James last reviewed this educational content on 7/1/2019 2000-2021 [employer]. All rights reserved. This information is not intended as a substitute for professional medical care. Always follow your healthcare professional's instructions.

## 2021-08-18 DIAGNOSIS — L03.032 PARONYCHIA OF TOE, LEFT: ICD-10-CM

## 2021-08-18 RX ORDER — CEPHALEXIN 500 MG/1
500 CAPSULE ORAL 3 TIMES DAILY
Qty: 9 CAPSULE | Refills: 0 | Status: SHIPPED | OUTPATIENT
Start: 2021-08-18 | End: 2021-08-21

## 2021-08-18 NOTE — TELEPHONE ENCOUNTER
Patient's wife calling stating patient went out of town and forgot to bring his Keflex.   Patient needing 2 days of this medication sent to pended pharmacy.     Bouchra COYNEN, RN

## 2021-10-10 ENCOUNTER — HEALTH MAINTENANCE LETTER (OUTPATIENT)
Age: 66
End: 2021-10-10

## 2022-01-29 ENCOUNTER — HEALTH MAINTENANCE LETTER (OUTPATIENT)
Age: 67
End: 2022-01-29

## 2022-09-18 ENCOUNTER — HEALTH MAINTENANCE LETTER (OUTPATIENT)
Age: 67
End: 2022-09-18

## 2023-05-07 ENCOUNTER — HEALTH MAINTENANCE LETTER (OUTPATIENT)
Age: 68
End: 2023-05-07

## 2024-02-08 ENCOUNTER — LAB (OUTPATIENT)
Dept: URBAN - METROPOLITAN AREA SURGERY CENTER 5 | Facility: SURGERY CENTER | Age: 69
End: 2024-02-08

## 2024-02-09 ENCOUNTER — DAC (OUTPATIENT)
Dept: URBAN - METROPOLITAN AREA SURGERY CENTER 5 | Facility: SURGERY CENTER | Age: 69
End: 2024-02-09
Payer: MEDICARE

## 2024-02-09 DIAGNOSIS — Z86.010 PERSONAL HISTORY OF COLONIC POLYPS: ICD-10-CM

## 2024-02-09 DIAGNOSIS — K57.30 DIVERTICULOSIS OF LARGE INTESTINE WITHOUT PERFORATION OR ABSCESS WITHOUT BLEEDING: ICD-10-CM

## 2024-02-09 DIAGNOSIS — K63.5 POLYP OF ASCENDING COLON, UNSPECIFIED TYPE: ICD-10-CM

## 2024-02-09 DIAGNOSIS — K64.8 OTHER HEMORRHOIDS: ICD-10-CM

## 2024-02-09 PROCEDURE — 45385 COLONOSCOPY W/LESION REMOVAL: CPT | Performed by: INTERNAL MEDICINE

## 2024-07-14 ENCOUNTER — HEALTH MAINTENANCE LETTER (OUTPATIENT)
Age: 69
End: 2024-07-14